# Patient Record
Sex: FEMALE | Race: WHITE | NOT HISPANIC OR LATINO | URBAN - METROPOLITAN AREA
[De-identification: names, ages, dates, MRNs, and addresses within clinical notes are randomized per-mention and may not be internally consistent; named-entity substitution may affect disease eponyms.]

---

## 2016-07-28 RX ORDER — ENOXAPARIN SODIUM 100 MG/ML
30 INJECTION SUBCUTANEOUS
Qty: 0 | Refills: 0 | COMMUNITY
Start: 2016-07-28 | End: 2016-08-27

## 2018-02-17 ENCOUNTER — INPATIENT (INPATIENT)
Facility: HOSPITAL | Age: 63
LOS: 4 days | Discharge: HOME | DRG: 436 | End: 2018-02-22
Attending: INTERNAL MEDICINE | Admitting: INTERNAL MEDICINE
Payer: MEDICARE

## 2018-02-17 VITALS
SYSTOLIC BLOOD PRESSURE: 125 MMHG | RESPIRATION RATE: 22 BRPM | TEMPERATURE: 98 F | HEART RATE: 130 BPM | DIASTOLIC BLOOD PRESSURE: 71 MMHG | OXYGEN SATURATION: 96 %

## 2018-02-17 DIAGNOSIS — I10 ESSENTIAL (PRIMARY) HYPERTENSION: ICD-10-CM

## 2018-02-17 DIAGNOSIS — Z98.89 OTHER SPECIFIED POSTPROCEDURAL STATES: Chronic | ICD-10-CM

## 2018-02-17 DIAGNOSIS — Z29.9 ENCOUNTER FOR PROPHYLACTIC MEASURES, UNSPECIFIED: ICD-10-CM

## 2018-02-17 DIAGNOSIS — R60.0 LOCALIZED EDEMA: ICD-10-CM

## 2018-02-17 DIAGNOSIS — C22.0 LIVER CELL CARCINOMA: ICD-10-CM

## 2018-02-17 DIAGNOSIS — J44.9 CHRONIC OBSTRUCTIVE PULMONARY DISEASE, UNSPECIFIED: ICD-10-CM

## 2018-02-17 DIAGNOSIS — E11.9 TYPE 2 DIABETES MELLITUS WITHOUT COMPLICATIONS: ICD-10-CM

## 2018-02-17 DIAGNOSIS — R74.8 ABNORMAL LEVELS OF OTHER SERUM ENZYMES: ICD-10-CM

## 2018-02-17 DIAGNOSIS — E87.70 FLUID OVERLOAD, UNSPECIFIED: ICD-10-CM

## 2018-02-17 LAB
ALBUMIN SERPL ELPH-MCNC: 2.1 G/DL — LOW (ref 3.5–5)
ALP SERPL-CCNC: 280 U/L — HIGH (ref 40–120)
ALT FLD-CCNC: 76 U/L DA — HIGH (ref 10–60)
ANION GAP SERPL CALC-SCNC: 14 MMOL/L — SIGNIFICANT CHANGE UP (ref 5–17)
AST SERPL-CCNC: 338 U/L — HIGH (ref 10–40)
BILIRUB DIRECT SERPL-MCNC: 3.1 MG/DL — HIGH (ref 0–0.2)
BILIRUB SERPL-MCNC: 4.3 MG/DL — HIGH (ref 0.2–1.2)
BUN SERPL-MCNC: 18 MG/DL — SIGNIFICANT CHANGE UP (ref 7–18)
CALCIUM SERPL-MCNC: 8.7 MG/DL — SIGNIFICANT CHANGE UP (ref 8.4–10.5)
CHLORIDE SERPL-SCNC: 99 MMOL/L — SIGNIFICANT CHANGE UP (ref 96–108)
CO2 SERPL-SCNC: 23 MMOL/L — SIGNIFICANT CHANGE UP (ref 22–31)
CREAT SERPL-MCNC: 0.7 MG/DL — SIGNIFICANT CHANGE UP (ref 0.5–1.3)
GLUCOSE BLDC GLUCOMTR-MCNC: 104 MG/DL — HIGH (ref 70–99)
GLUCOSE BLDC GLUCOMTR-MCNC: 96 MG/DL — SIGNIFICANT CHANGE UP (ref 70–99)
GLUCOSE SERPL-MCNC: 123 MG/DL — HIGH (ref 70–99)
HCT VFR BLD CALC: 39.4 % — SIGNIFICANT CHANGE UP (ref 34.5–45)
HGB BLD-MCNC: 12.1 G/DL — SIGNIFICANT CHANGE UP (ref 11.5–15.5)
MCHC RBC-ENTMCNC: 27.9 PG — SIGNIFICANT CHANGE UP (ref 27–34)
MCHC RBC-ENTMCNC: 30.8 GM/DL — LOW (ref 32–36)
MCV RBC AUTO: 90.5 FL — SIGNIFICANT CHANGE UP (ref 80–100)
NT-PROBNP SERPL-SCNC: 883 PG/ML — HIGH (ref 0–125)
PLATELET # BLD AUTO: 155 K/UL — SIGNIFICANT CHANGE UP (ref 150–400)
POTASSIUM SERPL-MCNC: 3.3 MMOL/L — LOW (ref 3.5–5.3)
POTASSIUM SERPL-SCNC: 3.3 MMOL/L — LOW (ref 3.5–5.3)
PROT SERPL-MCNC: 6.8 G/DL — SIGNIFICANT CHANGE UP (ref 6–8.3)
RBC # BLD: 4.35 M/UL — SIGNIFICANT CHANGE UP (ref 3.8–5.2)
RBC # FLD: 19.3 % — HIGH (ref 10.3–14.5)
SODIUM SERPL-SCNC: 136 MMOL/L — SIGNIFICANT CHANGE UP (ref 135–145)
TROPONIN I SERPL-MCNC: <0.015 NG/ML — SIGNIFICANT CHANGE UP (ref 0–0.04)
WBC # BLD: 15.3 K/UL — HIGH (ref 3.8–10.5)
WBC # FLD AUTO: 15.3 K/UL — HIGH (ref 3.8–10.5)

## 2018-02-17 PROCEDURE — 76705 ECHO EXAM OF ABDOMEN: CPT | Mod: 26

## 2018-02-17 PROCEDURE — 93010 ELECTROCARDIOGRAM REPORT: CPT

## 2018-02-17 PROCEDURE — 99285 EMERGENCY DEPT VISIT HI MDM: CPT

## 2018-02-17 PROCEDURE — 71046 X-RAY EXAM CHEST 2 VIEWS: CPT | Mod: 26

## 2018-02-17 RX ORDER — IPRATROPIUM/ALBUTEROL SULFATE 18-103MCG
3 AEROSOL WITH ADAPTER (GRAM) INHALATION EVERY 6 HOURS
Qty: 0 | Refills: 0 | Status: DISCONTINUED | OUTPATIENT
Start: 2018-02-17 | End: 2018-02-19

## 2018-02-17 RX ORDER — TRAMADOL HYDROCHLORIDE 50 MG/1
50 TABLET ORAL EVERY 6 HOURS
Qty: 0 | Refills: 0 | Status: DISCONTINUED | OUTPATIENT
Start: 2018-02-17 | End: 2018-02-21

## 2018-02-17 RX ORDER — TIOTROPIUM BROMIDE 18 UG/1
1 CAPSULE ORAL; RESPIRATORY (INHALATION) DAILY
Qty: 0 | Refills: 0 | Status: DISCONTINUED | OUTPATIENT
Start: 2018-02-17 | End: 2018-02-22

## 2018-02-17 RX ORDER — NICOTINE POLACRILEX 2 MG
1 GUM BUCCAL DAILY
Qty: 0 | Refills: 0 | Status: DISCONTINUED | OUTPATIENT
Start: 2018-02-17 | End: 2018-02-22

## 2018-02-17 RX ORDER — THIAMINE MONONITRATE (VIT B1) 100 MG
100 TABLET ORAL DAILY
Qty: 0 | Refills: 0 | Status: DISCONTINUED | OUTPATIENT
Start: 2018-02-17 | End: 2018-02-22

## 2018-02-17 RX ORDER — INSULIN GLARGINE 100 [IU]/ML
30 INJECTION, SOLUTION SUBCUTANEOUS AT BEDTIME
Qty: 0 | Refills: 0 | Status: DISCONTINUED | OUTPATIENT
Start: 2018-02-17 | End: 2018-02-22

## 2018-02-17 RX ORDER — METOPROLOL TARTRATE 50 MG
25 TABLET ORAL
Qty: 0 | Refills: 0 | Status: DISCONTINUED | OUTPATIENT
Start: 2018-02-17 | End: 2018-02-22

## 2018-02-17 RX ORDER — FUROSEMIDE 40 MG
80 TABLET ORAL ONCE
Qty: 0 | Refills: 0 | Status: COMPLETED | OUTPATIENT
Start: 2018-02-17 | End: 2018-02-17

## 2018-02-17 RX ORDER — INSULIN LISPRO 100/ML
VIAL (ML) SUBCUTANEOUS
Qty: 0 | Refills: 0 | Status: DISCONTINUED | OUTPATIENT
Start: 2018-02-17 | End: 2018-02-22

## 2018-02-17 RX ORDER — ENOXAPARIN SODIUM 100 MG/ML
40 INJECTION SUBCUTANEOUS DAILY
Qty: 0 | Refills: 0 | Status: DISCONTINUED | OUTPATIENT
Start: 2018-02-17 | End: 2018-02-22

## 2018-02-17 RX ORDER — CAPTOPRIL 12.5 MG/1
12.5 TABLET ORAL ONCE
Qty: 0 | Refills: 0 | Status: COMPLETED | OUTPATIENT
Start: 2018-02-17 | End: 2018-02-17

## 2018-02-17 RX ORDER — FUROSEMIDE 40 MG
40 TABLET ORAL
Qty: 0 | Refills: 0 | Status: DISCONTINUED | OUTPATIENT
Start: 2018-02-17 | End: 2018-02-22

## 2018-02-17 RX ORDER — POTASSIUM CHLORIDE 20 MEQ
40 PACKET (EA) ORAL ONCE
Qty: 0 | Refills: 0 | Status: COMPLETED | OUTPATIENT
Start: 2018-02-17 | End: 2018-02-17

## 2018-02-17 RX ORDER — INFLUENZA VIRUS VACCINE 15; 15; 15; 15 UG/.5ML; UG/.5ML; UG/.5ML; UG/.5ML
0.5 SUSPENSION INTRAMUSCULAR ONCE
Qty: 0 | Refills: 0 | Status: COMPLETED | OUTPATIENT
Start: 2018-02-17 | End: 2018-02-22

## 2018-02-17 RX ADMIN — Medication 40 MILLIGRAM(S): at 19:50

## 2018-02-17 RX ADMIN — Medication 80 MILLIGRAM(S): at 16:25

## 2018-02-17 RX ADMIN — INSULIN GLARGINE 30 UNIT(S): 100 INJECTION, SOLUTION SUBCUTANEOUS at 22:16

## 2018-02-17 RX ADMIN — ENOXAPARIN SODIUM 40 MILLIGRAM(S): 100 INJECTION SUBCUTANEOUS at 22:16

## 2018-02-17 RX ADMIN — Medication 40 MILLIEQUIVALENT(S): at 19:20

## 2018-02-17 RX ADMIN — CAPTOPRIL 12.5 MILLIGRAM(S): 12.5 TABLET ORAL at 17:17

## 2018-02-17 NOTE — H&P ADULT - PROBLEM SELECTOR PLAN 1
-Not likely to be cellulitis or Venous stasis of extremities because skin is intact and his normal color . Pro-bnp is almost normal to which makes CHF less likely of a diagnosis causing people edema.   -likely secondary to liver failure from hepatitis c and h/o  HCC   -Monitor liver enzymes closely   -elevated bilurubin { but no abdominal pain or fever }  follow with abd usg   c/w lasix 40 mg I.V BID   -MAY benefit from albumin i.v 50 mg drip  follow with duplex usg { to r/o  clots }  -tramadol for pain   constant repositioning

## 2018-02-17 NOTE — H&P ADULT - PROBLEM SELECTOR PLAN 6
Holding oral hypoglycemics  - Continue with HISS  - Monitor blood sugar before meals and at bedtime.   -c/w lantus 30 units at bedtime

## 2018-02-17 NOTE — ED ADULT TRIAGE NOTE - CHIEF COMPLAINT QUOTE
C/o " I was sent by my PMD Dr Flores " my legs are swollen, I need the fluid taken out I can't walk. I haven't had my diabetes checked in two years, my stomach needs to be checked its swollen and I have a growth on my neck that's getting bigger".

## 2018-02-17 NOTE — H&P ADULT - HISTORY OF PRESENT ILLNESS
62 yo F from home , with a hx of HCC involving 2 lobes of liver diagnosed in 6/2016 s/p IR embolization, COPD (not on home O2), IDDM, Hep C , HTN, Herpes Zoster presents to the E.D for worsening lower extremity edema . patient very poor historian and very upset staying IN ED does not want to give any history . patient denies any shortness of breath and able to lie down flat and comfortable appearing . patient complaining of severe pain in legs . patient denies any chest pain , shortness of breath , nausea , vomiting , abdominal pain but denies any blood in stool or any other urinary complaints.    IN the E.D patient vitals are temp-98 , hr-130 and rr-22 and spo2-96 and cxr -no evidence of pulmonary congestion

## 2018-02-17 NOTE — ED PROVIDER NOTE - PMH
Bilateral edema of lower extremity    COPD (chronic obstructive pulmonary disease)    Diabetes mellitus type 2, insulin dependent    Hepatitis C    Hepatocellular carcinoma    Herpes zoster without complication    Other specified diabetes mellitus with unspecified complications    Secondary hypertension, hypertension with unspecified goal    Varicose veins

## 2018-02-17 NOTE — H&P ADULT - NSHPLABSRESULTS_GEN_ALL_CORE
Vital Signs Last 24 Hrs  T(C): 36.7 (17 Feb 2018 15:01), Max: 36.7 (17 Feb 2018 15:01)  T(F): 98 (17 Feb 2018 15:01), Max: 98 (17 Feb 2018 15:01)  HR: 130 (17 Feb 2018 15:01) (130 - 130)  BP: 125/71 (17 Feb 2018 15:01) (125/71 - 125/71)  BP(mean): --  RR: 22 (17 Feb 2018 15:01) (22 - 22)  SpO2: 96% (17 Feb 2018 15:01) (96% - 96%)      LABS:                        12.1   15.3  )-----------( 155      ( 17 Feb 2018 16:31 )             39.4     02-17    136  |  99  |  18  ----------------------------<  123<H>  3.3<L>   |  23  |  0.70    Ca    8.7      17 Feb 2018 16:31    TPro  6.8  /  Alb  2.1<L>  /  TBili  4.3<H>  /  DBili  x   /  AST  338<H>  /  ALT  76<H>  /  AlkPhos  280<H>  02-17        CAPILLARY BLOOD GLUCOSE          RADIOLOGY & ADDITIONAL TESTS:    Imaging Personally Reviewed: cxr ; clear lungs

## 2018-02-17 NOTE — ED PROVIDER NOTE - MEDICAL DECISION MAKING DETAILS
pitting lower extremity edema, lung auscultation minimal crackles at bases, cxr no fluid overload. bnp 800s. abd distention, nontender, given h/o cirrhosis possible new ascites. will do sono, diurese.   admit for further workup and mgmt

## 2018-02-17 NOTE — H&P ADULT - FAMILY HISTORY
Father  Still living? No  Family history of coronary artery disease, Age at diagnosis: Age Unknown     Mother  Still living? No  Family history of diabetes mellitus, Age at diagnosis: Age Unknown

## 2018-02-17 NOTE — H&P ADULT - PROBLEM SELECTOR PLAN 3
S/p IR embolization today   -No apparent hematoma in groin site   HEPATIC, HEPATIC SEGMENT 2, 2 branches of the hepatic segment 2 arteriogram and Particle embolization of hepatic segment 2 arterial   branches supplying the tumor.

## 2018-02-17 NOTE — H&P ADULT - ASSESSMENT
62 yo F from home , with a hx of HCC involving 2 lobes of liver diagnosed in 6/2016 s/p IR embolization, COPD (not on home O2), IDDM, Hep C , HTN, Herpes Zoster presents to the E.D for worsening lower extremity edema .      will admit the patient for   1} anasarca   2}ELEVATED liver enzymes   3]copd   4}need for prophylactic measure

## 2018-02-17 NOTE — ED ADULT NURSE NOTE - OBJECTIVE STATEMENT
received Pt Pt from triage via wheelchair alert and verbally responsive complaining on B/L LE swelling  Pt noted with +4 pitting edema and distended abd also reports difficulty in breathing denies C/P

## 2018-02-17 NOTE — ED PROVIDER NOTE - CONDUCTED A DETAILED DISCUSSION WITH PATIENT AND/OR GUARDIAN REGARDING, MDM
need for outpatient follow-up/radiology results/lab results radiology results/need to admit/lab results

## 2018-02-17 NOTE — H&P ADULT - PROBLEM SELECTOR PLAN 2
likely secondary to liver failure from hepatitis c and h/o  HCC   -Monitor liver enzymes closely   -elevated bilurubin { but no abdominal pain or fever }  follow with abd usg likely secondary to liver failure from hepatitis c and h/o  HCC   -Monitor liver enzymes closely   AST:ALT RATIO 2:1  { CONCERN FOR ALCOHOL ABUSE }  -elevated bilurubin { but no abdominal pain or fever }  follow with abd usg  FOLLOW WITH BLOOD ALCOHOL

## 2018-02-17 NOTE — ED PROVIDER NOTE - OBJECTIVE STATEMENT
64 y/o F pt with PMHx of b/l Lower Extremity Edema, COPD, Type II DM (unchecked in x2 years; last took insulin this morning at 11:00am), Hepatitis C, Hepatocellular Carcinoma, Herpes Zoster, HTN, and Varicose Veins and PSHx of Eye Surgery, Liver Biopsy, and Lumpectomy presents to ED c/o chronic b/l leg swelling that worsened approximately x7-10 days ago. Pt additionally reports abdominal distension. Pt states she has been unable to walk secondary to pain associated with her varicose veins, making pt immobile and bed-bound for several weeks. Per pt, pt visited her PMD earlier today, and was instructed to visit the ED for further evaluation. Pt denies fever, chills, CP, or any other complaints. Pt also denies Hx of liver cirrhosis, or Hx of abdominal surgeries in the past. Pt is currently on Furosemide 40mg BID. Allergies: Penicillin (anaphylaxis). PMD: Dr. Flores.

## 2018-02-18 LAB
ALBUMIN SERPL ELPH-MCNC: 1.8 G/DL — LOW (ref 3.5–5)
ALBUMIN SERPL ELPH-MCNC: 1.9 G/DL — LOW (ref 3.5–5)
ALP SERPL-CCNC: 259 U/L — HIGH (ref 40–120)
ALP SERPL-CCNC: 264 U/L — HIGH (ref 40–120)
ALT FLD-CCNC: 72 U/L DA — HIGH (ref 10–60)
ALT FLD-CCNC: 73 U/L DA — HIGH (ref 10–60)
ANION GAP SERPL CALC-SCNC: 9 MMOL/L — SIGNIFICANT CHANGE UP (ref 5–17)
APPEARANCE UR: CLEAR — SIGNIFICANT CHANGE UP
AST SERPL-CCNC: 369 U/L — HIGH (ref 10–40)
AST SERPL-CCNC: 370 U/L — HIGH (ref 10–40)
BASOPHILS # BLD AUTO: 0.1 K/UL — SIGNIFICANT CHANGE UP (ref 0–0.2)
BASOPHILS NFR BLD AUTO: 1 % — SIGNIFICANT CHANGE UP (ref 0–2)
BILIRUB DIRECT SERPL-MCNC: 3.8 MG/DL — HIGH (ref 0–0.2)
BILIRUB INDIRECT FLD-MCNC: 0.9 MG/DL — SIGNIFICANT CHANGE UP (ref 0.2–1)
BILIRUB SERPL-MCNC: 4.7 MG/DL — HIGH (ref 0.2–1.2)
BILIRUB SERPL-MCNC: 4.8 MG/DL — HIGH (ref 0.2–1.2)
BILIRUB UR-MCNC: ABNORMAL
BUN SERPL-MCNC: 19 MG/DL — HIGH (ref 7–18)
CALCIUM SERPL-MCNC: 8.8 MG/DL — SIGNIFICANT CHANGE UP (ref 8.4–10.5)
CHLORIDE SERPL-SCNC: 100 MMOL/L — SIGNIFICANT CHANGE UP (ref 96–108)
CHOLEST SERPL-MCNC: 185 MG/DL — SIGNIFICANT CHANGE UP (ref 10–199)
CO2 SERPL-SCNC: 27 MMOL/L — SIGNIFICANT CHANGE UP (ref 22–31)
COLOR SPEC: ABNORMAL
CREAT SERPL-MCNC: 0.74 MG/DL — SIGNIFICANT CHANGE UP (ref 0.5–1.3)
DIFF PNL FLD: NEGATIVE — SIGNIFICANT CHANGE UP
EOSINOPHIL # BLD AUTO: 0 K/UL — SIGNIFICANT CHANGE UP (ref 0–0.5)
EOSINOPHIL NFR BLD AUTO: 0.3 % — SIGNIFICANT CHANGE UP (ref 0–6)
ETHANOL SERPL-MCNC: <3 MG/DL — SIGNIFICANT CHANGE UP (ref 0–10)
GLUCOSE BLDC GLUCOMTR-MCNC: 109 MG/DL — HIGH (ref 70–99)
GLUCOSE BLDC GLUCOMTR-MCNC: 140 MG/DL — HIGH (ref 70–99)
GLUCOSE BLDC GLUCOMTR-MCNC: 179 MG/DL — HIGH (ref 70–99)
GLUCOSE BLDC GLUCOMTR-MCNC: 84 MG/DL — SIGNIFICANT CHANGE UP (ref 70–99)
GLUCOSE SERPL-MCNC: 95 MG/DL — SIGNIFICANT CHANGE UP (ref 70–99)
GLUCOSE UR QL: NEGATIVE — SIGNIFICANT CHANGE UP
HBA1C BLD-MCNC: 5.8 % — HIGH (ref 4–5.6)
HCT VFR BLD CALC: 35.9 % — SIGNIFICANT CHANGE UP (ref 34.5–45)
HDLC SERPL-MCNC: 10 MG/DL — LOW (ref 40–125)
HGB BLD-MCNC: 11.1 G/DL — LOW (ref 11.5–15.5)
KETONES UR-MCNC: NEGATIVE — SIGNIFICANT CHANGE UP
LEUKOCYTE ESTERASE UR-ACNC: ABNORMAL
LIPID PNL WITH DIRECT LDL SERPL: 138 MG/DL — SIGNIFICANT CHANGE UP
LYMPHOCYTES # BLD AUTO: 15.7 % — SIGNIFICANT CHANGE UP (ref 13–44)
LYMPHOCYTES # BLD AUTO: 2 K/UL — SIGNIFICANT CHANGE UP (ref 1–3.3)
MAGNESIUM SERPL-MCNC: 2.1 MG/DL — SIGNIFICANT CHANGE UP (ref 1.6–2.6)
MCHC RBC-ENTMCNC: 28.9 PG — SIGNIFICANT CHANGE UP (ref 27–34)
MCHC RBC-ENTMCNC: 30.9 GM/DL — LOW (ref 32–36)
MCV RBC AUTO: 93.4 FL — SIGNIFICANT CHANGE UP (ref 80–100)
MONOCYTES # BLD AUTO: 1.5 K/UL — HIGH (ref 0–0.9)
MONOCYTES NFR BLD AUTO: 11.6 % — SIGNIFICANT CHANGE UP (ref 2–14)
NEUTROPHILS # BLD AUTO: 9.1 K/UL — HIGH (ref 1.8–7.4)
NEUTROPHILS NFR BLD AUTO: 71.5 % — SIGNIFICANT CHANGE UP (ref 43–77)
NITRITE UR-MCNC: NEGATIVE — SIGNIFICANT CHANGE UP
PCP SPEC-MCNC: SIGNIFICANT CHANGE UP
PH UR: 6 — SIGNIFICANT CHANGE UP (ref 5–8)
PHOSPHATE SERPL-MCNC: 1.8 MG/DL — LOW (ref 2.5–4.5)
PLATELET # BLD AUTO: 143 K/UL — LOW (ref 150–400)
POTASSIUM SERPL-MCNC: 3.2 MMOL/L — LOW (ref 3.5–5.3)
POTASSIUM SERPL-SCNC: 3.2 MMOL/L — LOW (ref 3.5–5.3)
PROCALCITONIN SERPL-MCNC: 0.4 NG/ML — HIGH (ref 0–0.04)
PROT SERPL-MCNC: 6.2 G/DL — SIGNIFICANT CHANGE UP (ref 6–8.3)
PROT SERPL-MCNC: 6.3 G/DL — SIGNIFICANT CHANGE UP (ref 6–8.3)
PROT UR-MCNC: NEGATIVE — SIGNIFICANT CHANGE UP
RBC # BLD: 3.85 M/UL — SIGNIFICANT CHANGE UP (ref 3.8–5.2)
RBC # FLD: 19.9 % — HIGH (ref 10.3–14.5)
SODIUM SERPL-SCNC: 136 MMOL/L — SIGNIFICANT CHANGE UP (ref 135–145)
SP GR SPEC: 1.01 — SIGNIFICANT CHANGE UP (ref 1.01–1.02)
TOTAL CHOLESTEROL/HDL RATIO MEASUREMENT: 18.5 RATIO — HIGH (ref 3.3–7.1)
TRIGL SERPL-MCNC: 185 MG/DL — HIGH (ref 10–149)
TSH SERPL-MCNC: 2.86 UU/ML — SIGNIFICANT CHANGE UP (ref 0.34–4.82)
UROBILINOGEN FLD QL: 8
VIT B12 SERPL-MCNC: 1487 PG/ML — HIGH (ref 232–1245)
WBC # BLD: 12.7 K/UL — HIGH (ref 3.8–10.5)
WBC # FLD AUTO: 12.7 K/UL — HIGH (ref 3.8–10.5)

## 2018-02-18 RX ADMIN — Medication 25 MILLIGRAM(S): at 17:35

## 2018-02-18 RX ADMIN — TIOTROPIUM BROMIDE 1 CAPSULE(S): 18 CAPSULE ORAL; RESPIRATORY (INHALATION) at 12:33

## 2018-02-18 RX ADMIN — TRAMADOL HYDROCHLORIDE 50 MILLIGRAM(S): 50 TABLET ORAL at 21:40

## 2018-02-18 RX ADMIN — TRAMADOL HYDROCHLORIDE 50 MILLIGRAM(S): 50 TABLET ORAL at 11:06

## 2018-02-18 RX ADMIN — TRAMADOL HYDROCHLORIDE 50 MILLIGRAM(S): 50 TABLET ORAL at 15:16

## 2018-02-18 RX ADMIN — Medication 40 MILLIGRAM(S): at 17:35

## 2018-02-18 RX ADMIN — TRAMADOL HYDROCHLORIDE 50 MILLIGRAM(S): 50 TABLET ORAL at 21:39

## 2018-02-18 RX ADMIN — Medication 1 TABLET(S): at 12:33

## 2018-02-18 RX ADMIN — ENOXAPARIN SODIUM 40 MILLIGRAM(S): 100 INJECTION SUBCUTANEOUS at 12:34

## 2018-02-18 RX ADMIN — INSULIN GLARGINE 30 UNIT(S): 100 INJECTION, SOLUTION SUBCUTANEOUS at 22:01

## 2018-02-18 RX ADMIN — TRAMADOL HYDROCHLORIDE 50 MILLIGRAM(S): 50 TABLET ORAL at 09:35

## 2018-02-18 RX ADMIN — Medication 100 MILLIGRAM(S): at 12:33

## 2018-02-18 RX ADMIN — TRAMADOL HYDROCHLORIDE 50 MILLIGRAM(S): 50 TABLET ORAL at 16:46

## 2018-02-18 NOTE — PROGRESS NOTE ADULT - SUBJECTIVE AND OBJECTIVE BOX
Patient is a 63y old  Female who presents with a chief complaint of worsening lower extremity edema (17 Feb 2018 19:27)    PATIENT IS SEEN AND EXAMINED IN MEDICAL FLOOR.      ALLERGIES:  penicillin (Anaphylaxis)        VITALS:    Vital Signs Last 24 Hrs  T(C): 36.3 (18 Feb 2018 12:11), Max: 37.1 (17 Feb 2018 20:04)  T(F): 97.3 (18 Feb 2018 12:11), Max: 98.7 (17 Feb 2018 20:04)  HR: 100 (18 Feb 2018 12:11) (81 - 100)  BP: 113/79 (18 Feb 2018 12:11) (94/54 - 130/68)  BP(mean): --  RR: 16 (18 Feb 2018 12:11) (14 - 20)  SpO2: 97% (18 Feb 2018 12:11) (96% - 100%)    LABS:  CBC Full  -  ( 18 Feb 2018 07:52 )  WBC Count : 12.7 K/uL  Hemoglobin : 11.1 g/dL  Hematocrit : 35.9 %  Platelet Count - Automated : 143 K/uL  Mean Cell Volume : 93.4 fl  Mean Cell Hemoglobin : 28.9 pg  Mean Cell Hemoglobin Concentration : 30.9 gm/dL  Auto Neutrophil # : 9.1 K/uL  Auto Lymphocyte # : 2.0 K/uL  Auto Monocyte # : 1.5 K/uL  Auto Eosinophil # : 0.0 K/uL  Auto Basophil # : 0.1 K/uL  Auto Neutrophil % : 71.5 %  Auto Lymphocyte % : 15.7 %  Auto Monocyte % : 11.6 %  Auto Eosinophil % : 0.3 %  Auto Basophil % : 1.0 %      02-18    136  |  100  |  19<H>  ----------------------------<  95  3.2<L>   |  27  |  0.74    Ca    8.8      18 Feb 2018 07:52  Phos  1.8     02-18  Mg     2.1     02-18    TPro  6.2  /  Alb  1.8<L>  /  TBili  4.8<H>  /  DBili  3.8<H>  /  AST  370<H>  /  ALT  73<H>  /  AlkPhos  264<H>  02-18    CAPILLARY BLOOD GLUCOSE      POCT Blood Glucose.: 179 mg/dL (18 Feb 2018 11:30)  POCT Blood Glucose.: 109 mg/dL (18 Feb 2018 07:25)  POCT Blood Glucose.: 96 mg/dL (17 Feb 2018 21:56)  POCT Blood Glucose.: 104 mg/dL (17 Feb 2018 19:54)    CARDIAC MARKERS ( 17 Feb 2018 16:31 )  <0.015 ng/mL / x     / x     / x     / x          LIVER FUNCTIONS - ( 18 Feb 2018 07:52 )  Alb: 1.8 g/dL / Pro: 6.2 g/dL / ALK PHOS: 264 U/L / ALT: 73 U/L DA / AST: 370 U/L / GGT: x                     MEDICATIONS:    MEDICATIONS  (STANDING):  ALBUTerol/ipratropium for Nebulization 3 milliLiter(s) Nebulizer every 6 hours  enoxaparin Injectable 40 milliGRAM(s) SubCutaneous daily  furosemide   Injectable 40 milliGRAM(s) IV Push two times a day  influenza   Vaccine 0.5 milliLiter(s) IntraMuscular once  insulin glargine Injectable (LANTUS) 30 Unit(s) SubCutaneous at bedtime  insulin lispro (HumaLOG) corrective regimen sliding scale   SubCutaneous Before meals and at bedtime  metoprolol     tartrate 25 milliGRAM(s) Oral two times a day  multivitamin 1 Tablet(s) Oral daily  nicotine -  14 mG/24Hr(s) Patch 1 patch Transdermal daily  thiamine 100 milliGRAM(s) Oral daily  tiotropium 18 MICROgram(s) Capsule 1 Capsule(s) Inhalation daily      MEDICATIONS  (PRN):  LORazepam   Injectable 2 milliGRAM(s) IV Push every 6 hours PRN agitation, anxiety, assaultive behavior. CIWA >7  traMADol 50 milliGRAM(s) Oral every 6 hours PRN Moderate Pain (4 - 6)      REVIEW OF SYSTEMS:                           ALL ROS DONE [ X   ]    CONSTITUTIONAL:  LETHARGIC [   ], FEVER [   ], UNRESPONSIVE [   ]  CVS:  CP  [   ], SOB, [   ], PALPITATIONS [   ], DIZZYNESS [   ]  RS: COUGH [   ], SPUTUM [   ]  GI: ABDOMINAL PAIN [   ], NAUSEA [   ], VOMITINGS [   ], DIARRHEA [   ], CONSTIPATION [   ]  :  DYSURIA [   ], NOCTURIA [   ], INCREASED FREQUENCY [   ], DRIBLING [   ],  SKELETAL: PAINFUL JOINTS [   ], SWOLLEN JOINTS [   ], NECK ACHE [   ], LOW BACK ACHE [   ],  SKIN : ULCERS [   ], RASH [   ], ITCHING [   ]  CNS: HEAD ACHE [   ], DOUBLE VISION [   ], BLURRED VISION [   ], AMS / CONFUSION [   ], SEIZURES [   ], WEAKNESS [   ],TINGLING / NUMBNESS [   ]    PHYSICAL EXAMINATION:  GENERAL APPEARANCE: NO DISTRESS  HEENT:  NO PALLOR, NO  JVD,  NO   NODES, NECK SUPPLE  CVS: S1 +, S2 +,   RS: AEEB,  OCCASIONAL  RALES +,   NO RONCHI  ABD: SOFT, NT, NO, BS +  EXT: NO PE  SKIN: WARM,   SKELETAL:  ROM ACCEPTABLE  CNS:  AAO X    ,   DEFICITS    RADIOLOGY :    < from: US Hepatic & Pancreatic (02.17.18 @ 20:28) >  EXAM:  US LIVER AND PANCREAS                            PROCEDURE DATE:  02/17/2018          INTERPRETATION:  Clinical indication: Abdominal pain, history of   cirrhosis.    Technique: Targeted right upper quadrant ultrasound of the abdomen was   performed.      Comparison: MR 6/12/2016. CT 6/9/2016.     Findings: This study was technically difficult due to overlying bowel gas.    LIVER: 17.0 cm in length. Heterogeneous, diffusely increased echotexture,   which may reflect fatty infiltration/cirrhosis. Indeterminate lesions,   for example, 12.5 x 14.0 x 13.8 cm in the right lobe and 3.2 x 2.6 x 2.4   cm in the left lobe.   BILIARY: No intrahepatic or extrahepatic biliary dilatation. Visualized   common bile duct measuring up to 0.5 cm.   GALLBLADDER: No gallstone. Nonspecific gallbladder wall thickening   measuring up to 0.5 cm. No pericholecystic fluid. Negative sonographic   Esparza sign.    PANCREAS: Not visualized due to bowel gas.  RIGHT KIDNEY: 10.0 cm in length. No hydronephrosis or obvious renal   stone.   ADDITIONAL: No ascites.     Impression:      Indeterminate hepatic lesions, especially in the right lobe as described,   suspicious for progression of neoplasm. Recommend correlation with   previous interval outside study. Follow-up contrast-enhanced cross   sectional imaging (preferably MRI) would be helpful for further   evaluation.     Nonspecific gallbladder wall thickening. No cholelithiasis or sonographic   evidence of acute cholecystitis.     The pancreas was notvisualized, limiting evaluation.    < end of copied text >    < from: Xray Chest 2 Views PA/Lat (02.17.18 @ 16:27) >  EXAM:  XR CHEST PA LAT 2V                            PROCEDURE DATE:  02/17/2018          INTERPRETATION:  Chest 2 views    HISTORY: Leg swelling for 10 days    Comparison: 6/6/2016    Frontal and lateral views of the chest were obtained with suboptimal   inspiration. With allowance for this, the heart is normal in size and the   lungs show normal pulmonary vascularity. Apical pleural thickening is   unchanged. There is no evidence of pneumothorax nor pleural effusion.     IMPRESSION: No evidence of pulmonary congestion    < end of copied text >      ASSESSMENT :     Hypervolemia  Bilateral edema of lower extremity  Herpes zoster without complication  Diabetes mellitus type 2, insulin dependent  Hepatocellular carcinoma  Hepatitis C  Varicose veins  Secondary hypertension, hypertension with unspecified goal  Other specified diabetes mellitus with unspecified complications  COPD (chronic obstructive pulmonary disease)  CHF (congestive heart failure)  History of liver biopsy  H/O eye surgery  H/O lumpectomy  No significant past surgical history      PLAN:  HPI:  62 yo F from home , with a hx of HCC involving 2 lobes of liver diagnosed in 6/2016 s/p IR embolization, COPD (not on home O2), IDDM, Hep C , HTN, Herpes Zoster presents to the E.D for worsening lower extremity edema . patient very poor historian and very upset staying IN ED does not want to give any history . patient denies any shortness of breath and able to lie down flat and comfortable appearing . patient complaining of severe pain in legs . patient denies any chest pain , shortness of breath , nausea , vomiting , abdominal pain but denies any blood in stool or any other urinary complaints.    IN the E.D patient vitals are temp-98 , hr-130 and rr-22 and spo2-96 and cxr -no evidence of pulmonary congestion (17 Feb 2018 19:27)    - Patient is a 63y old  Female who presents with a chief complaint of worsening lower extremity edema (17 Feb 2018 19:27)    PATIENT IS SEEN AND EXAMINED IN MEDICAL FLOOR.      ALLERGIES:  penicillin (Anaphylaxis)        VITALS:    Vital Signs Last 24 Hrs  T(C): 36.3 (18 Feb 2018 12:11), Max: 37.1 (17 Feb 2018 20:04)  T(F): 97.3 (18 Feb 2018 12:11), Max: 98.7 (17 Feb 2018 20:04)  HR: 100 (18 Feb 2018 12:11) (81 - 100)  BP: 113/79 (18 Feb 2018 12:11) (94/54 - 130/68)  BP(mean): --  RR: 16 (18 Feb 2018 12:11) (14 - 20)  SpO2: 97% (18 Feb 2018 12:11) (96% - 100%)    LABS:  CBC Full  -  ( 18 Feb 2018 07:52 )  WBC Count : 12.7 K/uL  Hemoglobin : 11.1 g/dL  Hematocrit : 35.9 %  Platelet Count - Automated : 143 K/uL  Mean Cell Volume : 93.4 fl  Mean Cell Hemoglobin : 28.9 pg  Mean Cell Hemoglobin Concentration : 30.9 gm/dL  Auto Neutrophil # : 9.1 K/uL  Auto Lymphocyte # : 2.0 K/uL  Auto Monocyte # : 1.5 K/uL  Auto Eosinophil # : 0.0 K/uL  Auto Basophil # : 0.1 K/uL  Auto Neutrophil % : 71.5 %  Auto Lymphocyte % : 15.7 %  Auto Monocyte % : 11.6 %  Auto Eosinophil % : 0.3 %  Auto Basophil % : 1.0 %      02-18    136  |  100  |  19<H>  ----------------------------<  95  3.2<L>   |  27  |  0.74    Ca    8.8      18 Feb 2018 07:52  Phos  1.8     02-18  Mg     2.1     02-18    TPro  6.2  /  Alb  1.8<L>  /  TBili  4.8<H>  /  DBili  3.8<H>  /  AST  370<H>  /  ALT  73<H>  /  AlkPhos  264<H>  02-18    CAPILLARY BLOOD GLUCOSE      POCT Blood Glucose.: 179 mg/dL (18 Feb 2018 11:30)  POCT Blood Glucose.: 109 mg/dL (18 Feb 2018 07:25)  POCT Blood Glucose.: 96 mg/dL (17 Feb 2018 21:56)  POCT Blood Glucose.: 104 mg/dL (17 Feb 2018 19:54)    CARDIAC MARKERS ( 17 Feb 2018 16:31 )  <0.015 ng/mL / x     / x     / x     / x          LIVER FUNCTIONS - ( 18 Feb 2018 07:52 )  Alb: 1.8 g/dL / Pro: 6.2 g/dL / ALK PHOS: 264 U/L / ALT: 73 U/L DA / AST: 370 U/L / GGT: x                     MEDICATIONS:    MEDICATIONS  (STANDING):  ALBUTerol/ipratropium for Nebulization 3 milliLiter(s) Nebulizer every 6 hours  enoxaparin Injectable 40 milliGRAM(s) SubCutaneous daily  furosemide   Injectable 40 milliGRAM(s) IV Push two times a day  influenza   Vaccine 0.5 milliLiter(s) IntraMuscular once  insulin glargine Injectable (LANTUS) 30 Unit(s) SubCutaneous at bedtime  insulin lispro (HumaLOG) corrective regimen sliding scale   SubCutaneous Before meals and at bedtime  metoprolol     tartrate 25 milliGRAM(s) Oral two times a day  multivitamin 1 Tablet(s) Oral daily  nicotine -  14 mG/24Hr(s) Patch 1 patch Transdermal daily  thiamine 100 milliGRAM(s) Oral daily  tiotropium 18 MICROgram(s) Capsule 1 Capsule(s) Inhalation daily      MEDICATIONS  (PRN):  LORazepam   Injectable 2 milliGRAM(s) IV Push every 6 hours PRN agitation, anxiety, assaultive behavior. CIWA >7  traMADol 50 milliGRAM(s) Oral every 6 hours PRN Moderate Pain (4 - 6)      REVIEW OF SYSTEMS:                           ALL ROS DONE [ X   ]    CONSTITUTIONAL:  LETHARGIC [   ], FEVER [   ], UNRESPONSIVE [   ]  CVS:  CP  [   ], SOB, [   ], PALPITATIONS [   ], DIZZYNESS [   ]  RS: COUGH [   ], SPUTUM [   ]  GI: ABDOMINAL PAIN [   ], NAUSEA [   ], VOMITINGS [   ], DIARRHEA [   ], CONSTIPATION [   ]  :  DYSURIA [   ], NOCTURIA [   ], INCREASED FREQUENCY [   ], DRIBLING [   ],  SKELETAL: PAINFUL JOINTS [   ], SWOLLEN JOINTS [   ], NECK ACHE [   ], LOW BACK ACHE [   ],  SKIN : ULCERS [   ], RASH [   ], ITCHING [   ]  CNS: HEAD ACHE [   ], DOUBLE VISION [   ], BLURRED VISION [   ], AMS / CONFUSION [   ], SEIZURES [   ], WEAKNESS [   ],TINGLING / NUMBNESS [   ]    PHYSICAL EXAMINATION:  GENERAL APPEARANCE: NO DISTRESS  HEENT:  NO PALLOR, NO  JVD,  NO   NODES, NECK SUPPLE  CVS: S1 +, S2 +,   RS: AEEB,  OCCASIONAL  RALES +,  B/L  RONCHI  ABD: SOFT, NO, BS +, ASCITES +  EXT:  PE  ++  SKIN: WARM,   SKELETAL:  ROM ACCEPTABLE  CNS:  AAO X 3   , NO  DEFICITS    RADIOLOGY :    < from: US Hepatic & Pancreatic (02.17.18 @ 20:28) >  EXAM:  US LIVER AND PANCREAS                            PROCEDURE DATE:  02/17/2018          INTERPRETATION:  Clinical indication: Abdominal pain, history of   cirrhosis.    Technique: Targeted right upper quadrant ultrasound of the abdomen was   performed.      Comparison: MR 6/12/2016. CT 6/9/2016.     Findings: This study was technically difficult due to overlying bowel gas.    LIVER: 17.0 cm in length. Heterogeneous, diffusely increased echotexture,   which may reflect fatty infiltration/cirrhosis. Indeterminate lesions,   for example, 12.5 x 14.0 x 13.8 cm in the right lobe and 3.2 x 2.6 x 2.4   cm in the left lobe.   BILIARY: No intrahepatic or extrahepatic biliary dilatation. Visualized   common bile duct measuring up to 0.5 cm.   GALLBLADDER: No gallstone. Nonspecific gallbladder wall thickening   measuring up to 0.5 cm. No pericholecystic fluid. Negative sonographic   Esparza sign.    PANCREAS: Not visualized due to bowel gas.  RIGHT KIDNEY: 10.0 cm in length. No hydronephrosis or obvious renal   stone.   ADDITIONAL: No ascites.     Impression:      Indeterminate hepatic lesions, especially in the right lobe as described,   suspicious for progression of neoplasm. Recommend correlation with   previous interval outside study. Follow-up contrast-enhanced cross   sectional imaging (preferably MRI) would be helpful for further   evaluation.     Nonspecific gallbladder wall thickening. No cholelithiasis or sonographic   evidence of acute cholecystitis.     The pancreas was notvisualized, limiting evaluation.    < end of copied text >    < from: Xray Chest 2 Views PA/Lat (02.17.18 @ 16:27) >  EXAM:  XR CHEST PA LAT 2V                            PROCEDURE DATE:  02/17/2018          INTERPRETATION:  Chest 2 views    HISTORY: Leg swelling for 10 days    Comparison: 6/6/2016    Frontal and lateral views of the chest were obtained with suboptimal   inspiration. With allowance for this, the heart is normal in size and the   lungs show normal pulmonary vascularity. Apical pleural thickening is   unchanged. There is no evidence of pneumothorax nor pleural effusion.     IMPRESSION: No evidence of pulmonary congestion    < end of copied text >      ASSESSMENT :     Hypervolemia  Bilateral edema of lower extremity  Herpes zoster without complication  Diabetes mellitus type 2, insulin dependent  Hepatocellular carcinoma  Hepatitis C  Varicose veins  Secondary hypertension, hypertension with unspecified goal  Other specified diabetes mellitus with unspecified complications  COPD (chronic obstructive pulmonary disease)  CHF (congestive heart failure)  History of liver biopsy  H/O eye surgery  H/O lumpectomy      PLAN:  HPI:  62 yo F from home , with a hx of HCC involving 2 lobes of liver diagnosed in 6/2016 s/p IR embolization, COPD (not on home O2), IDDM, Hep C , HTN, Herpes Zoster presents to the E.D for worsening lower extremity edema . patient very poor historian and very upset staying IN ED does not want to give any history . patient denies any shortness of breath and able to lie down flat and comfortable appearing . patient complaining of severe pain in legs . patient denies any chest pain , shortness of breath , nausea , vomiting , abdominal pain but denies any blood in stool or any other urinary complaints.    IN the E.D patient vitals are temp-98 , hr-130 and rr-22 and spo2-96 and cxr -no evidence of pulmonary congestion (17 Feb 2018 19:27)    - SUSPECT RECURRENCE OF HEPATIC MALIGNANCY WITH B/L LOWER EXTREMITY LYMPHEDEMA  - OBTAIN ALPHAFETOPROTEIN, CEA, CA 19-9 MARKERS.   - OBTAIN ONCOLOGY EVALUATION BY DR. VENEGAS . DR. VENEGAS TO ADVISE IF HE NEEDS TISSUE DIAGNOSIS BY IR GUIDED DIAGNOSTIC PARACENTESIS   - GI AND DVT PROPHYLAXIS  - FURTHER OUT PATIENT EVALUATION AND MANAGEMENT OF MALIGNANCY ONCE IT IS CONFIRMED  - DR. CLEANING

## 2018-02-19 DIAGNOSIS — R60.9 EDEMA, UNSPECIFIED: ICD-10-CM

## 2018-02-19 DIAGNOSIS — C22.0 LIVER CELL CARCINOMA: ICD-10-CM

## 2018-02-19 DIAGNOSIS — R18.8 OTHER ASCITES: ICD-10-CM

## 2018-02-19 LAB
GLUCOSE BLDC GLUCOMTR-MCNC: 103 MG/DL — HIGH (ref 70–99)
GLUCOSE BLDC GLUCOMTR-MCNC: 106 MG/DL — HIGH (ref 70–99)
GLUCOSE BLDC GLUCOMTR-MCNC: 128 MG/DL — HIGH (ref 70–99)
GLUCOSE BLDC GLUCOMTR-MCNC: 187 MG/DL — HIGH (ref 70–99)
GLUCOSE BLDC GLUCOMTR-MCNC: 96 MG/DL — SIGNIFICANT CHANGE UP (ref 70–99)

## 2018-02-19 RX ORDER — POTASSIUM CHLORIDE 20 MEQ
40 PACKET (EA) ORAL ONCE
Qty: 0 | Refills: 0 | Status: COMPLETED | OUTPATIENT
Start: 2018-02-19 | End: 2018-02-19

## 2018-02-19 RX ORDER — INSULIN GLARGINE 100 [IU]/ML
15 INJECTION, SOLUTION SUBCUTANEOUS ONCE
Qty: 0 | Refills: 0 | Status: COMPLETED | OUTPATIENT
Start: 2018-02-19 | End: 2018-02-19

## 2018-02-19 RX ADMIN — Medication 40 MILLIGRAM(S): at 17:40

## 2018-02-19 RX ADMIN — TRAMADOL HYDROCHLORIDE 50 MILLIGRAM(S): 50 TABLET ORAL at 15:00

## 2018-02-19 RX ADMIN — Medication 1: at 11:52

## 2018-02-19 RX ADMIN — TRAMADOL HYDROCHLORIDE 50 MILLIGRAM(S): 50 TABLET ORAL at 11:50

## 2018-02-19 RX ADMIN — ENOXAPARIN SODIUM 40 MILLIGRAM(S): 100 INJECTION SUBCUTANEOUS at 11:53

## 2018-02-19 RX ADMIN — TRAMADOL HYDROCHLORIDE 50 MILLIGRAM(S): 50 TABLET ORAL at 21:10

## 2018-02-19 RX ADMIN — TRAMADOL HYDROCHLORIDE 50 MILLIGRAM(S): 50 TABLET ORAL at 05:54

## 2018-02-19 RX ADMIN — Medication 25 MILLIGRAM(S): at 05:55

## 2018-02-19 RX ADMIN — TRAMADOL HYDROCHLORIDE 50 MILLIGRAM(S): 50 TABLET ORAL at 20:22

## 2018-02-19 RX ADMIN — TIOTROPIUM BROMIDE 1 CAPSULE(S): 18 CAPSULE ORAL; RESPIRATORY (INHALATION) at 11:53

## 2018-02-19 RX ADMIN — Medication 40 MILLIGRAM(S): at 05:54

## 2018-02-19 RX ADMIN — Medication 40 MILLIEQUIVALENT(S): at 17:43

## 2018-02-19 NOTE — CONSULT NOTE ADULT - PROBLEM SELECTOR RECOMMENDATION 3
at legs due to liver cirrhosis   will do paracentesis, after that leg edema will be easier to deal with

## 2018-02-19 NOTE — CONSULT NOTE ADULT - PROBLEM SELECTOR RECOMMENDATION 9
had 2 hepatoma 2.6 and 1.5 cm s/p embolization  now Sono showed 2 large masses  will do CT with contrast

## 2018-02-19 NOTE — CHART NOTE - NSCHARTNOTEFT_GEN_A_CORE
Patient is a 63y old  Female who presents with a chief complaint of worsening lower extremity edema (17 Feb 2018 19:27)  D/w dr. Galeas, patient needs hem/onc eval for possible diagnostic paracentesis r/o recurrence hepatic CA. Dr. Payne consult requested, pending.  c/w IV diuresis, electrolyte replacement PRN.

## 2018-02-19 NOTE — CONSULT NOTE ADULT - SUBJECTIVE AND OBJECTIVE BOX
61 year old lady with HepC treated with interferon 8 years ago and had undetectable viral load 2016.  @ hepatoma were found in 2016 and she had 2 embolization done.  She has been on diuretics to control edema and ascites.  the edema and ascites began to increase 10 days ago.  she has pain at legs.  no sob and she was able to eat normally.  no fever orchisll.  alert, but not cooperative. no palpable nodes at neck. chest is clear. abd is tense distended.  2+ edema at both legs.  Complete Blood Count + Automated Diff (02.18.18 @ 07:52)    WBC Count: 12.7 K/uL    RBC Count: 3.85 M/uL    Hemoglobin: 11.1 g/dL    Hematocrit: 35.9 %    Mean Cell Volume: 93.4 fl    Mean Cell Hemoglobin: 28.9 pg    Mean Cell Hemoglobin Conc: 30.9 gm/dL    Red Cell Distrib Width: 19.9 %    Platelet Count - Automated: 143 K/uL    Auto Neutrophil #: 9.1 K/uL    Auto Lymphocyte #: 2.0 K/uL    Auto Monocyte #: 1.5 K/uL    Auto Eosinophil #: 0.0 K/uL    Auto Basophil #: 0.1 K/uL    Auto Neutrophil %: 71.5: Differential percentages must be correlated with absolute numbers for  clinical significance. %    Auto Lymphocyte %: 15.7 %    Auto Monocyte %: 11.6 %    Auto Eosinophil %: 0.3 %    Auto Basophil %: 1.0 %    Comprehensive Metabolic Panel in AM (02.18.18 @ 07:52)    Sodium, Serum: 136 mmol/L    Potassium, Serum: 3.2 mmol/L    Chloride, Serum: 100 mmol/L    Carbon Dioxide, Serum: 27 mmol/L    Anion Gap, Serum: 9 mmol/L    Blood Urea Nitrogen, Serum: 19 mg/dL    Creatinine, Serum: 0.74 mg/dL    Glucose, Serum: 95 mg/dL    Calcium, Total Serum: 8.8 mg/dL    Protein Total, Serum: 6.2 g/dL    Albumin, Serum: 1.8 g/dL    Bilirubin Total, Serum: 4.8 mg/dL    Alkaline Phosphatase, Serum: 264 U/L    Aspartate Aminotransferase (AST/SGOT): 370 U/L    Alanine Aminotransferase (ALT/SGPT): 73 U/L DA    eGFR if Non : 85: Interpretative comment  The units for eGFR are ml/min/1.73m2 (normalized body surface area). The  eGFR is calculated from a serum creatinine using the CKD-EPI equation.  Other variables required for calculation are race, age and sex. Among  patients with chronic kidney disease (CKD), the eGFR is useful in  determining the stage of disease according to KDOQI CKD classification.  All eGFR results are reported numerically with the following  interpretation.          GFR                    With                 Without     (ml/min/1.73 m2)    Kidney Damage       Kidney Damage        >= 90                    Stage 1                     Normal        60-89                    Stage 2                     Decreased GFR        30-59     Stage 3                     Stage 3        15-29                    Stage 4                     Stage 4        < 15                      Stage 5                     Stage 5  Each stage of CKD assumes that the associated GFR level has been in  effect for at least 3 months. Determination of stages one and two (with  eGFR > 59 ml/min/m2) requires estimation of kidney damage for at least 3  months as defined by structural or functional abnormalities.  Limitations: All estimates of GFR will be less accurate for patients at  extremes of muscle mass (including but not limited to frail elderly,  critically ill, or cancer patients), those with unusual diets, and those  with conditions associated with reduced secretion or extrarenal  elimination of creatinine. The eGFR equation is not recommended for use  in patients with unstable creatinine levels. mL/min/1.73M2    eGFR if African American: 99 mL/min/1.73M2  < from: US Hepatic & Pancreatic (02.17.18 @ 20:28) >  INTERPRETATION:  Clinical indication: Abdominal pain, history of   cirrhosis.    Technique: Targeted right upper quadrant ultrasound of the abdomen was   performed.      Comparison: MR 6/12/2016. CT 6/9/2016.     Findings: This study was technically difficult due to overlying bowel gas.    LIVER: 17.0 cm in length. Heterogeneous, diffusely increased echotexture,   which may reflect fatty infiltration/cirrhosis. Indeterminate lesions,   for example, 12.5 x 14.0 x 13.8 cm in the right lobe and 3.2 x 2.6 x 2.4   cm in the left lobe.   BILIARY: No intrahepatic or extrahepatic biliary dilatation. Visualized   common bile duct measuring up to 0.5 cm.   GALLBLADDER: No gallstone. Nonspecific gallbladder wall thickening   measuring up to 0.5 cm. No pericholecystic fluid. Negative sonographic   Esparza sign.    PANCREAS: Not visualized due to bowel gas.  RIGHT KIDNEY: 10.0 cm in length. No hydronephrosis or obvious renal   stone.   ADDITIONAL: No ascites.     Impression:      Indeterminate hepatic lesions, especially in the right lobe as described,   suspicious for progression of neoplasm. Recommend correlation with   previous interval outside study. Follow-up contrast-enhanced cross   sectional imaging (preferably MRI) would be helpful for further   evaluation.     Nonspecific gallbladder wall thickening. No cholelithiasis or sonographic   evidence of acute cholecystitis.       < end of copied text >

## 2018-02-20 LAB
AFP-TM SERPL-MCNC: HIGH NG/ML
ALBUMIN SERPL ELPH-MCNC: 1.9 G/DL — LOW (ref 3.5–5)
ALP SERPL-CCNC: 309 U/L — HIGH (ref 40–120)
ALT FLD-CCNC: 80 U/L DA — HIGH (ref 10–60)
ANION GAP SERPL CALC-SCNC: 10 MMOL/L — SIGNIFICANT CHANGE UP (ref 5–17)
APTT BLD: 29.1 SEC — SIGNIFICANT CHANGE UP (ref 27.5–37.4)
AST SERPL-CCNC: 469 U/L — HIGH (ref 10–40)
BILIRUB SERPL-MCNC: 5.3 MG/DL — HIGH (ref 0.2–1.2)
BUN SERPL-MCNC: 16 MG/DL — SIGNIFICANT CHANGE UP (ref 7–18)
CALCIUM SERPL-MCNC: 8.5 MG/DL — SIGNIFICANT CHANGE UP (ref 8.4–10.5)
CANCER AG19-9 SERPL-ACNC: 1678.8 U/ML — HIGH
CEA SERPL-MCNC: 6.2 NG/ML — HIGH (ref 0–3.8)
CHLORIDE SERPL-SCNC: 98 MMOL/L — SIGNIFICANT CHANGE UP (ref 96–108)
CO2 SERPL-SCNC: 28 MMOL/L — SIGNIFICANT CHANGE UP (ref 22–31)
CREAT SERPL-MCNC: 0.78 MG/DL — SIGNIFICANT CHANGE UP (ref 0.5–1.3)
GLUCOSE BLDC GLUCOMTR-MCNC: 127 MG/DL — HIGH (ref 70–99)
GLUCOSE BLDC GLUCOMTR-MCNC: 160 MG/DL — HIGH (ref 70–99)
GLUCOSE BLDC GLUCOMTR-MCNC: 90 MG/DL — SIGNIFICANT CHANGE UP (ref 70–99)
GLUCOSE SERPL-MCNC: 114 MG/DL — HIGH (ref 70–99)
HCT VFR BLD CALC: 36.6 % — SIGNIFICANT CHANGE UP (ref 34.5–45)
HGB BLD-MCNC: 11.6 G/DL — SIGNIFICANT CHANGE UP (ref 11.5–15.5)
INR BLD: 1.57 RATIO — HIGH (ref 0.88–1.16)
MAGNESIUM SERPL-MCNC: 2.2 MG/DL — SIGNIFICANT CHANGE UP (ref 1.6–2.6)
MCHC RBC-ENTMCNC: 29.5 PG — SIGNIFICANT CHANGE UP (ref 27–34)
MCHC RBC-ENTMCNC: 31.8 GM/DL — LOW (ref 32–36)
MCV RBC AUTO: 92.8 FL — SIGNIFICANT CHANGE UP (ref 80–100)
PLATELET # BLD AUTO: 148 K/UL — LOW (ref 150–400)
POTASSIUM SERPL-MCNC: 3.7 MMOL/L — SIGNIFICANT CHANGE UP (ref 3.5–5.3)
POTASSIUM SERPL-SCNC: 3.7 MMOL/L — SIGNIFICANT CHANGE UP (ref 3.5–5.3)
PROT SERPL-MCNC: 6.5 G/DL — SIGNIFICANT CHANGE UP (ref 6–8.3)
PROTHROM AB SERPL-ACNC: 17.3 SEC — HIGH (ref 9.8–12.7)
RBC # BLD: 3.94 M/UL — SIGNIFICANT CHANGE UP (ref 3.8–5.2)
RBC # FLD: 20 % — HIGH (ref 10.3–14.5)
SODIUM SERPL-SCNC: 136 MMOL/L — SIGNIFICANT CHANGE UP (ref 135–145)
WBC # BLD: 13.9 K/UL — HIGH (ref 3.8–10.5)
WBC # FLD AUTO: 13.9 K/UL — HIGH (ref 3.8–10.5)

## 2018-02-20 PROCEDURE — 74182 MRI ABDOMEN W/CONTRAST: CPT | Mod: 26

## 2018-02-20 RX ADMIN — TRAMADOL HYDROCHLORIDE 50 MILLIGRAM(S): 50 TABLET ORAL at 14:32

## 2018-02-20 RX ADMIN — TRAMADOL HYDROCHLORIDE 50 MILLIGRAM(S): 50 TABLET ORAL at 03:35

## 2018-02-20 RX ADMIN — INSULIN GLARGINE 15 UNIT(S): 100 INJECTION, SOLUTION SUBCUTANEOUS at 00:01

## 2018-02-20 RX ADMIN — TRAMADOL HYDROCHLORIDE 50 MILLIGRAM(S): 50 TABLET ORAL at 08:28

## 2018-02-20 RX ADMIN — Medication 1: at 22:02

## 2018-02-20 RX ADMIN — TRAMADOL HYDROCHLORIDE 50 MILLIGRAM(S): 50 TABLET ORAL at 02:44

## 2018-02-20 RX ADMIN — Medication 40 MILLIGRAM(S): at 18:46

## 2018-02-20 RX ADMIN — Medication 25 MILLIGRAM(S): at 06:54

## 2018-02-20 RX ADMIN — INSULIN GLARGINE 30 UNIT(S): 100 INJECTION, SOLUTION SUBCUTANEOUS at 21:59

## 2018-02-20 RX ADMIN — Medication 25 MILLIGRAM(S): at 18:45

## 2018-02-20 RX ADMIN — Medication 40 MILLIGRAM(S): at 06:54

## 2018-02-20 RX ADMIN — TIOTROPIUM BROMIDE 1 CAPSULE(S): 18 CAPSULE ORAL; RESPIRATORY (INHALATION) at 18:46

## 2018-02-20 RX ADMIN — TRAMADOL HYDROCHLORIDE 50 MILLIGRAM(S): 50 TABLET ORAL at 20:31

## 2018-02-20 NOTE — PROGRESS NOTE ADULT - SUBJECTIVE AND OBJECTIVE BOX
Patient is a 63y old  Female who presents with a chief complaint of worsening lower extremity edema (17 Feb 2018 19:27)    PATIENT IS SEEN AND EXAMINED IN MEDICAL FLOOR.    ALLERGIES:  penicillin (Anaphylaxis)    VITALS:    Vital Signs Last 24 Hrs  T(C): 36.8 (20 Feb 2018 12:52), Max: 36.8 (20 Feb 2018 12:52)  T(F): 98.2 (20 Feb 2018 12:52), Max: 98.2 (20 Feb 2018 12:52)  HR: 113 (20 Feb 2018 12:52) (105 - 119)  BP: 105/68 (20 Feb 2018 12:52) (105/68 - 116/78)  BP(mean): --  RR: 17 (20 Feb 2018 12:52) (16 - 17)  SpO2: 94% (20 Feb 2018 12:52) (94% - 94%)    LABS:  CBC Full  -  ( 20 Feb 2018 07:19 )  WBC Count : 13.9 K/uL  Hemoglobin : 11.6 g/dL  Hematocrit : 36.6 %  Platelet Count - Automated : 148 K/uL  Mean Cell Volume : 92.8 fl  Mean Cell Hemoglobin : 29.5 pg  Mean Cell Hemoglobin Concentration : 31.8 gm/dL  Auto Neutrophil # : x  Auto Lymphocyte # : x  Auto Monocyte # : x  Auto Eosinophil # : x  Auto Basophil # : x  Auto Neutrophil % : x  Auto Lymphocyte % : x  Auto Monocyte % : x  Auto Eosinophil % : x  Auto Basophil % : x    PT/INR - ( 20 Feb 2018 07:19 )   PT: 17.3 sec;   INR: 1.57 ratio         PTT - ( 20 Feb 2018 07:19 )  PTT:29.1 sec  02-20    136  |  98  |  16  ----------------------------<  114<H>  3.7   |  28  |  0.78    Ca    8.5      20 Feb 2018 07:19  Mg     2.2     02-20    TPro  6.5  /  Alb  1.9<L>  /  TBili  5.3<H>  /  DBili  x   /  AST  469<H>  /  ALT  80<H>  /  AlkPhos  309<H>  02-20    CAPILLARY BLOOD GLUCOSE      POCT Blood Glucose.: 90 mg/dL (20 Feb 2018 11:40)  POCT Blood Glucose.: 127 mg/dL (20 Feb 2018 08:03)  POCT Blood Glucose.: 103 mg/dL (19 Feb 2018 23:52)  POCT Blood Glucose.: 106 mg/dL (19 Feb 2018 22:14)        LIVER FUNCTIONS - ( 20 Feb 2018 07:19 )  Alb: 1.9 g/dL / Pro: 6.5 g/dL / ALK PHOS: 309 U/L / ALT: 80 U/L DA / AST: 469 U/L / GGT: x                     MEDICATIONS:    MEDICATIONS  (STANDING):  enoxaparin Injectable 40 milliGRAM(s) SubCutaneous daily  furosemide   Injectable 40 milliGRAM(s) IV Push two times a day  influenza   Vaccine 0.5 milliLiter(s) IntraMuscular once  insulin glargine Injectable (LANTUS) 30 Unit(s) SubCutaneous at bedtime  insulin lispro (HumaLOG) corrective regimen sliding scale   SubCutaneous Before meals and at bedtime  metoprolol     tartrate 25 milliGRAM(s) Oral two times a day  multivitamin 1 Tablet(s) Oral daily  nicotine -  14 mG/24Hr(s) Patch 1 patch Transdermal daily  thiamine 100 milliGRAM(s) Oral daily  tiotropium 18 MICROgram(s) Capsule 1 Capsule(s) Inhalation daily      MEDICATIONS  (PRN):  LORazepam   Injectable 2 milliGRAM(s) IV Push every 6 hours PRN agitation, anxiety, assaultive behavior. CIWA >7  traMADol 50 milliGRAM(s) Oral every 6 hours PRN Moderate Pain (4 - 6)      REVIEW OF SYSTEMS:                           ALL ROS DONE [ X   ]    CONSTITUTIONAL:  LETHARGIC [   ], FEVER [   ], UNRESPONSIVE [   ]  CVS:  CP  [   ], SOB, [   ], PALPITATIONS [   ], DIZZYNESS [   ]  RS: COUGH [   ], SPUTUM [   ]  GI: ABDOMINAL PAIN [   ], NAUSEA [   ], VOMITINGS [   ], DIARRHEA [   ], CONSTIPATION [   ]  :  DYSURIA [   ], NOCTURIA [   ], INCREASED FREQUENCY [   ], DRIBLING [   ],  SKELETAL: PAINFUL JOINTS [   ], SWOLLEN JOINTS [   ], NECK ACHE [   ], LOW BACK ACHE [   ],  SKIN : ULCERS [   ], RASH [   ], ITCHING [   ]  CNS: HEAD ACHE [   ], DOUBLE VISION [   ], BLURRED VISION [   ], AMS / CONFUSION [   ], SEIZURES [   ], WEAKNESS [   ],TINGLING / NUMBNESS [   ]    PHYSICAL EXAMINATION:  GENERAL APPEARANCE: NO DISTRESS  HEENT:  NO PALLOR, NO  JVD,  NO   NODES, NECK SUPPLE  CVS: S1 +, S2 +,   RS: AEEB,  OCCASIONAL  RALES +,  B/L  RONCHI  ABD: SOFT, NO, BS +, ASCITES +  EXT:  PE  ++  SKIN: WARM,   SKELETAL:  ROM ACCEPTABLE  CNS:  AAO X 3   , NO  DEFICITS    RADIOLOGY :    < from: US Hepatic & Pancreatic (02.17.18 @ 20:28) >  EXAM:  US LIVER AND PANCREAS                            PROCEDURE DATE:  02/17/2018          INTERPRETATION:  Clinical indication: Abdominal pain, history of   cirrhosis.    Technique: Targeted right upper quadrant ultrasound of the abdomen was   performed.      Comparison: MR 6/12/2016. CT 6/9/2016.     Findings: This study was technically difficult due to overlying bowel gas.    LIVER: 17.0 cm in length. Heterogeneous, diffusely increased echotexture,   which may reflect fatty infiltration/cirrhosis. Indeterminate lesions,   for example, 12.5 x 14.0 x 13.8 cm in the right lobe and 3.2 x 2.6 x 2.4   cm in the left lobe.   BILIARY: No intrahepatic or extrahepatic biliary dilatation. Visualized   common bile duct measuring up to 0.5 cm.   GALLBLADDER: No gallstone. Nonspecific gallbladder wall thickening   measuring up to 0.5 cm. No pericholecystic fluid. Negative sonographic   Esparza sign.    PANCREAS: Not visualized due to bowel gas.  RIGHT KIDNEY: 10.0 cm in length. No hydronephrosis or obvious renal   stone.   ADDITIONAL: No ascites.     Impression:      Indeterminate hepatic lesions, especially in the right lobe as described,   suspicious for progression of neoplasm. Recommend correlation with   previous interval outside study. Follow-up contrast-enhanced cross   sectional imaging (preferably MRI) would be helpful for further   evaluation.     Nonspecific gallbladder wall thickening. No cholelithiasis or sonographic   evidence of acute cholecystitis.     The pancreas was notvisualized, limiting evaluation.    < end of copied text >    < from: Xray Chest 2 Views PA/Lat (02.17.18 @ 16:27) >  EXAM:  XR CHEST PA LAT 2V                            PROCEDURE DATE:  02/17/2018          INTERPRETATION:  Chest 2 views    HISTORY: Leg swelling for 10 days    Comparison: 6/6/2016    Frontal and lateral views of the chest were obtained with suboptimal   inspiration. With allowance for this, the heart is normal in size and the   lungs show normal pulmonary vascularity. Apical pleural thickening is   unchanged. There is no evidence of pneumothorax nor pleural effusion.     IMPRESSION: No evidence of pulmonary congestion    < end of copied text >      ASSESSMENT :     Hypervolemia  Bilateral edema of lower extremity  Herpes zoster without complication  Diabetes mellitus type 2, insulin dependent  Hepatocellular carcinoma  Hepatitis C  Varicose veins  Secondary hypertension, hypertension with unspecified goal  Other specified diabetes mellitus with unspecified complications  COPD (chronic obstructive pulmonary disease)  CHF (congestive heart failure)  History of liver biopsy  H/O eye surgery  H/O lumpectomy      PLAN:  HPI:  60 yo F from home , with a hx of HCC involving 2 lobes of liver diagnosed in 6/2016 s/p IR embolization, COPD (not on home O2), IDDM, Hep C , HTN, Herpes Zoster presents to the E.D for worsening lower extremity edema . patient very poor historian and very upset staying IN ED does not want to give any history . patient denies any shortness of breath and able to lie down flat and comfortable appearing . patient complaining of severe pain in legs . patient denies any chest pain , shortness of breath , nausea , vomiting , abdominal pain but denies any blood in stool or any other urinary complaints.    IN the E.D patient vitals are temp-98 , hr-130 and rr-22 and spo2-96 and cxr -no evidence of pulmonary congestion (17 Feb 2018 19:27)    - DC PLAN HOME TO FOLLOW UP ONCOLOGY AS OUT PATIENT  - RECURRENCE OF HEPATIC MALIGNANCY WITH B/L LOWER EXTREMITY LYMPHEDEMA  - ALPHAFETOPROTEIN  - 150378, CA 19-9 - 1670.   - ONCOLOGY EVALUATION BY DR. VENEGAS . IR COULD NOT DO PARACENTESIS  - GI AND DVT PROPHYLAXIS  - FURTHER OUT PATIENT EVALUATION AND MANAGEMENT OF MALIGNANCY ONCE IT IS CONFIRMED  - DR. CLEANING

## 2018-02-20 NOTE — CHART NOTE - NSCHARTNOTEFT_GEN_A_CORE
IR procedure cancelled b/c not enough fluid to tap.      CT abdomen and pelvis w/ IV contrast was reordered as MRI abd & pelv w/ IV contrast as recommended by Radiology Dept.  Discussed w/ Dr. Payne.  MRI result pending-f/u

## 2018-02-20 NOTE — PROGRESS NOTE ADULT - SUBJECTIVE AND OBJECTIVE BOX
condition same  feel pain at both lower legs, it hurts just at touch  not at feet  no ascites  for MRI of abd  AFP 137075  ca19.9 1600  it is recurrent hepatoma  ?radioembolization or nexavarAlpha Fetoprotein - Tumor Marker (02.20.18 @ 09:13)    Alpha Fetoprotein - Tumor Marker: [id number]: Method: Roche Electrochemilumenescence  Values obtained with different assay methods or kits cannot be  used interchangeably.  Results cannot be interpreted as absolute evidence of the presence  or absence of malignant disease.  AFP values are not interpretable in pregnant females. ng/mL condition same  feel pain at both lower legs, it hurts just at touch  not at feet  no ascites  for MRI of abd  AFP [id number]  ca19.9 1600  it is recurrent hepatoma  ?radioembolization or nexava

## 2018-02-21 LAB
ALBUMIN SERPL ELPH-MCNC: 2 G/DL — LOW (ref 3.5–5)
ALP SERPL-CCNC: 290 U/L — HIGH (ref 40–120)
ALT FLD-CCNC: 85 U/L DA — HIGH (ref 10–60)
ANION GAP SERPL CALC-SCNC: 9 MMOL/L — SIGNIFICANT CHANGE UP (ref 5–17)
AST SERPL-CCNC: 560 U/L — HIGH (ref 10–40)
BILIRUB SERPL-MCNC: 5.6 MG/DL — HIGH (ref 0.2–1.2)
BUN SERPL-MCNC: 17 MG/DL — SIGNIFICANT CHANGE UP (ref 7–18)
CALCIUM SERPL-MCNC: 8.7 MG/DL — SIGNIFICANT CHANGE UP (ref 8.4–10.5)
CHLORIDE SERPL-SCNC: 96 MMOL/L — SIGNIFICANT CHANGE UP (ref 96–108)
CO2 SERPL-SCNC: 27 MMOL/L — SIGNIFICANT CHANGE UP (ref 22–31)
CREAT SERPL-MCNC: 0.77 MG/DL — SIGNIFICANT CHANGE UP (ref 0.5–1.3)
GLUCOSE BLDC GLUCOMTR-MCNC: 108 MG/DL — HIGH (ref 70–99)
GLUCOSE BLDC GLUCOMTR-MCNC: 127 MG/DL — HIGH (ref 70–99)
GLUCOSE BLDC GLUCOMTR-MCNC: 136 MG/DL — HIGH (ref 70–99)
GLUCOSE BLDC GLUCOMTR-MCNC: 148 MG/DL — HIGH (ref 70–99)
GLUCOSE SERPL-MCNC: 114 MG/DL — HIGH (ref 70–99)
HCT VFR BLD CALC: 37.8 % — SIGNIFICANT CHANGE UP (ref 34.5–45)
HGB BLD-MCNC: 11.9 G/DL — SIGNIFICANT CHANGE UP (ref 11.5–15.5)
MCHC RBC-ENTMCNC: 29.3 PG — SIGNIFICANT CHANGE UP (ref 27–34)
MCHC RBC-ENTMCNC: 31.6 GM/DL — LOW (ref 32–36)
MCV RBC AUTO: 92.5 FL — SIGNIFICANT CHANGE UP (ref 80–100)
PLATELET # BLD AUTO: 159 K/UL — SIGNIFICANT CHANGE UP (ref 150–400)
POTASSIUM SERPL-MCNC: 3.3 MMOL/L — LOW (ref 3.5–5.3)
POTASSIUM SERPL-SCNC: 3.3 MMOL/L — LOW (ref 3.5–5.3)
PROT SERPL-MCNC: 6.6 G/DL — SIGNIFICANT CHANGE UP (ref 6–8.3)
RBC # BLD: 4.08 M/UL — SIGNIFICANT CHANGE UP (ref 3.8–5.2)
RBC # FLD: 20.1 % — HIGH (ref 10.3–14.5)
SODIUM SERPL-SCNC: 132 MMOL/L — LOW (ref 135–145)
WBC # BLD: 14.1 K/UL — HIGH (ref 3.8–10.5)
WBC # FLD AUTO: 14.1 K/UL — HIGH (ref 3.8–10.5)

## 2018-02-21 RX ORDER — TRAMADOL HYDROCHLORIDE 50 MG/1
50 TABLET ORAL EVERY 4 HOURS
Qty: 0 | Refills: 0 | Status: DISCONTINUED | OUTPATIENT
Start: 2018-02-21 | End: 2018-02-22

## 2018-02-21 RX ORDER — POTASSIUM CHLORIDE 20 MEQ
40 PACKET (EA) ORAL EVERY 4 HOURS
Qty: 0 | Refills: 0 | Status: COMPLETED | OUTPATIENT
Start: 2018-02-21 | End: 2018-02-21

## 2018-02-21 RX ORDER — TRAMADOL HYDROCHLORIDE 50 MG/1
50 TABLET ORAL ONCE
Qty: 0 | Refills: 0 | Status: DISCONTINUED | OUTPATIENT
Start: 2018-02-21 | End: 2018-02-21

## 2018-02-21 RX ORDER — METOPROLOL TARTRATE 50 MG
25 TABLET ORAL ONCE
Qty: 0 | Refills: 0 | Status: DISCONTINUED | OUTPATIENT
Start: 2018-02-21 | End: 2018-02-21

## 2018-02-21 RX ADMIN — Medication 40 MILLIGRAM(S): at 17:54

## 2018-02-21 RX ADMIN — Medication 40 MILLIEQUIVALENT(S): at 09:10

## 2018-02-21 RX ADMIN — TRAMADOL HYDROCHLORIDE 50 MILLIGRAM(S): 50 TABLET ORAL at 15:45

## 2018-02-21 RX ADMIN — TIOTROPIUM BROMIDE 1 CAPSULE(S): 18 CAPSULE ORAL; RESPIRATORY (INHALATION) at 11:42

## 2018-02-21 RX ADMIN — Medication 40 MILLIGRAM(S): at 06:50

## 2018-02-21 RX ADMIN — TRAMADOL HYDROCHLORIDE 50 MILLIGRAM(S): 50 TABLET ORAL at 04:31

## 2018-02-21 RX ADMIN — TRAMADOL HYDROCHLORIDE 50 MILLIGRAM(S): 50 TABLET ORAL at 19:36

## 2018-02-21 RX ADMIN — INSULIN GLARGINE 30 UNIT(S): 100 INJECTION, SOLUTION SUBCUTANEOUS at 21:40

## 2018-02-21 RX ADMIN — TRAMADOL HYDROCHLORIDE 50 MILLIGRAM(S): 50 TABLET ORAL at 15:16

## 2018-02-21 RX ADMIN — Medication 25 MILLIGRAM(S): at 17:11

## 2018-02-21 RX ADMIN — TRAMADOL HYDROCHLORIDE 50 MILLIGRAM(S): 50 TABLET ORAL at 11:30

## 2018-02-21 RX ADMIN — TRAMADOL HYDROCHLORIDE 50 MILLIGRAM(S): 50 TABLET ORAL at 20:29

## 2018-02-21 RX ADMIN — ENOXAPARIN SODIUM 40 MILLIGRAM(S): 100 INJECTION SUBCUTANEOUS at 11:43

## 2018-02-21 RX ADMIN — TRAMADOL HYDROCHLORIDE 50 MILLIGRAM(S): 50 TABLET ORAL at 10:52

## 2018-02-21 RX ADMIN — Medication 40 MILLIEQUIVALENT(S): at 13:49

## 2018-02-21 NOTE — CHART NOTE - NSCHARTNOTEFT_GEN_A_CORE
MRI findings discussed w/ HemeOnc.      GOC discussed w/ pt and full code.    Attending requested palliative consult-pending

## 2018-02-21 NOTE — GOALS OF CARE CONVERSATION - PERSONAL ADVANCE DIRECTIVE - CONVERSATION DETAILS
Discussed her diagnosis of HCC.  Pt stated her cancer went away w/o treatment.  NP informed pt that her recent MRI 2/20 looks like the cancer has returned.   Pt verbalized understanding.

## 2018-02-21 NOTE — PROGRESS NOTE ADULT - SUBJECTIVE AND OBJECTIVE BOX
Patient is a 63y old  Female who presents with a chief complaint of worsening lower extremity edema (17 Feb 2018 19:27)    PATIENT IS SEEN AND EXAMINED IN MEDICAL FLOOR.    ALLERGIES:  penicillin (Anaphylaxis)    VITALS:    Vital Signs Last 24 Hrs  T(C): 37.2 (21 Feb 2018 20:29), Max: 37.3 (21 Feb 2018 05:39)  T(F): 99 (21 Feb 2018 20:29), Max: 99.1 (21 Feb 2018 05:39)  HR: 102 (21 Feb 2018 20:29) (102 - 136)  BP: 113/78 (21 Feb 2018 20:29) (106/63 - 132/73)  BP(mean): --  RR: 16 (21 Feb 2018 20:29) (16 - 16)  SpO2: 94% (21 Feb 2018 20:29) (93% - 96%)    LABS:  CBC Full  -  ( 21 Feb 2018 07:50 )  WBC Count : 14.1 K/uL  Hemoglobin : 11.9 g/dL  Hematocrit : 37.8 %  Platelet Count - Automated : 159 K/uL  Mean Cell Volume : 92.5 fl  Mean Cell Hemoglobin : 29.3 pg  Mean Cell Hemoglobin Concentration : 31.6 gm/dL  Auto Neutrophil # : x  Auto Lymphocyte # : x  Auto Monocyte # : x  Auto Eosinophil # : x  Auto Basophil # : x  Auto Neutrophil % : x  Auto Lymphocyte % : x  Auto Monocyte % : x  Auto Eosinophil % : x  Auto Basophil % : x    PT/INR - ( 20 Feb 2018 07:19 )   PT: 17.3 sec;   INR: 1.57 ratio         PTT - ( 20 Feb 2018 07:19 )  PTT:29.1 sec  02-21    132<L>  |  96  |  17  ----------------------------<  114<H>  3.3<L>   |  27  |  0.77    Ca    8.7      21 Feb 2018 07:50  Mg     2.2     02-20    TPro  6.6  /  Alb  2.0<L>  /  TBili  5.6<H>  /  DBili  x   /  AST  560<H>  /  ALT  85<H>  /  AlkPhos  290<H>  02-21    CAPILLARY BLOOD GLUCOSE      POCT Blood Glucose.: 127 mg/dL (21 Feb 2018 16:31)  POCT Blood Glucose.: 136 mg/dL (21 Feb 2018 11:53)  POCT Blood Glucose.: 108 mg/dL (21 Feb 2018 07:43)  POCT Blood Glucose.: 160 mg/dL (20 Feb 2018 21:43)        LIVER FUNCTIONS - ( 21 Feb 2018 07:50 )  Alb: 2.0 g/dL / Pro: 6.6 g/dL / ALK PHOS: 290 U/L / ALT: 85 U/L DA / AST: 560 U/L / GGT: x                     MEDICATIONS:    MEDICATIONS  (STANDING):  enoxaparin Injectable 40 milliGRAM(s) SubCutaneous daily  furosemide   Injectable 40 milliGRAM(s) IV Push two times a day  influenza   Vaccine 0.5 milliLiter(s) IntraMuscular once  insulin glargine Injectable (LANTUS) 30 Unit(s) SubCutaneous at bedtime  insulin lispro (HumaLOG) corrective regimen sliding scale   SubCutaneous Before meals and at bedtime  metoprolol     tartrate 25 milliGRAM(s) Oral two times a day  multivitamin 1 Tablet(s) Oral daily  nicotine -  14 mG/24Hr(s) Patch 1 patch Transdermal daily  thiamine 100 milliGRAM(s) Oral daily  tiotropium 18 MICROgram(s) Capsule 1 Capsule(s) Inhalation daily      MEDICATIONS  (PRN):  LORazepam   Injectable 2 milliGRAM(s) IV Push every 6 hours PRN agitation, anxiety, assaultive behavior. CIWA >7  traMADol 50 milliGRAM(s) Oral every 4 hours PRN Moderate Pain (4 - 6)      REVIEW OF SYSTEMS:                           ALL ROS DONE [ X   ]    CONSTITUTIONAL:  LETHARGIC [   ], FEVER [   ], UNRESPONSIVE [   ]  CVS:  CP  [   ], SOB, [   ], PALPITATIONS [   ], DIZZYNESS [   ]  RS: COUGH [   ], SPUTUM [   ]  GI: ABDOMINAL PAIN [   ], NAUSEA [   ], VOMITINGS [   ], DIARRHEA [   ], CONSTIPATION [   ]  :  DYSURIA [   ], NOCTURIA [   ], INCREASED FREQUENCY [   ], DRIBLING [   ],  SKELETAL: PAINFUL JOINTS [   ], SWOLLEN JOINTS [   ], NECK ACHE [   ], LOW BACK ACHE [   ],  SKIN : ULCERS [   ], RASH [   ], ITCHING [   ]  CNS: HEAD ACHE [   ], DOUBLE VISION [   ], BLURRED VISION [   ], AMS / CONFUSION [   ], SEIZURES [   ], WEAKNESS [   ],TINGLING / NUMBNESS [   ]    PHYSICAL EXAMINATION:  GENERAL APPEARANCE: NO DISTRESS  HEENT:  NO PALLOR, NO  JVD,  NO   NODES, NECK SUPPLE  CVS: S1 +, S2 +,   RS: AEEB,  OCCASIONAL  RALES +,   NO RONCHI  ABD: SOFT,  NO, BS +, ascites +  EXT:  PE  ++  SKIN: WARM,   SKELETAL:  ROM ACCEPTABLE  CNS:  AAO X 3   ,  NO DEFICITS    RADIOLOGY :    < from: MR Abdomen w/ IV Cont (02.20.18 @ 18:13) >  IMPRESSION:   Incomplete study. Suboptimal due to absence of intravenous contrast.  Increased size and extent of bilobar hepatic lesions, consistent with   neoplasm.    Increased size of complex lesions in the upper pole of the left kidney,   previously demonstrating low-level enhancement and suspicious for   neoplasm.    < end of copied text >    ASSESSMENT :     Hypervolemia  Bilateral edema of lower extremity  Herpes zoster without complication  Diabetes mellitus type 2, insulin dependent  Hepatocellular carcinoma  Hepatitis C  Varicose veins  Secondary hypertension, hypertension with unspecified goal  Other specified diabetes mellitus with unspecified complications  COPD (chronic obstructive pulmonary disease)  CHF (congestive heart failure)  History of liver biopsy  H/O eye surgery  H/O lumpectomy      PLAN:  HPI:  60 yo F from home , with a hx of HCC involving 2 lobes of liver diagnosed in 6/2016 s/p IR embolization, COPD (not on home O2), IDDM, Hep C , HTN, Herpes Zoster presents to the E.D for worsening lower extremity edema . patient very poor historian and very upset staying IN ED does not want to give any history . patient denies any shortness of breath and able to lie down flat and comfortable appearing . patient complaining of severe pain in legs . patient denies any chest pain , shortness of breath , nausea , vomiting , abdominal pain but denies any blood in stool or any other urinary complaints.    IN the E.D patient vitals are temp-98 , hr-130 and rr-22 and spo2-96 and cxr -no evidence of pulmonary congestion (17 Feb 2018 19:27)    - RECURRENT AND METASTATIC HEPATOCELLULAR CARCINOMA - D/W ONCOLOGY - PATIENT IS APPROPRIATE FOR PALLIATIVE CARE SINCE PROGNOSIS IS POOR  - LYMPHEDEMA OF NB/L EXTREMITIES DUE TO METASTATIC HEPATOCELLULAR MALIGNANCY  - SINUS TACHYCARDIA - OBSERVE ON METOPROLOL  - POOR PROGNOSIS  - PATIENT IS APPROPRIATE FOR PALLIATIVE CARE   - GI AND DVT PROPHYLAXIS  - DR. CLEANING

## 2018-02-22 ENCOUNTER — TRANSCRIPTION ENCOUNTER (OUTPATIENT)
Age: 63
End: 2018-02-22

## 2018-02-22 VITALS
OXYGEN SATURATION: 96 % | TEMPERATURE: 98 F | RESPIRATION RATE: 19 BRPM | HEART RATE: 90 BPM | SYSTOLIC BLOOD PRESSURE: 120 MMHG | DIASTOLIC BLOOD PRESSURE: 71 MMHG

## 2018-02-22 DIAGNOSIS — Z51.5 ENCOUNTER FOR PALLIATIVE CARE: ICD-10-CM

## 2018-02-22 DIAGNOSIS — R52 PAIN, UNSPECIFIED: ICD-10-CM

## 2018-02-22 LAB
ALBUMIN SERPL ELPH-MCNC: 2.1 G/DL — LOW (ref 3.5–5)
ALP SERPL-CCNC: 285 U/L — HIGH (ref 40–120)
ALT FLD-CCNC: 96 U/L DA — HIGH (ref 10–60)
ANION GAP SERPL CALC-SCNC: 11 MMOL/L — SIGNIFICANT CHANGE UP (ref 5–17)
AST SERPL-CCNC: 642 U/L — HIGH (ref 10–40)
BILIRUB SERPL-MCNC: 6.6 MG/DL — HIGH (ref 0.2–1.2)
BUN SERPL-MCNC: 16 MG/DL — SIGNIFICANT CHANGE UP (ref 7–18)
CALCIUM SERPL-MCNC: 9.1 MG/DL — SIGNIFICANT CHANGE UP (ref 8.4–10.5)
CHLORIDE SERPL-SCNC: 97 MMOL/L — SIGNIFICANT CHANGE UP (ref 96–108)
CO2 SERPL-SCNC: 26 MMOL/L — SIGNIFICANT CHANGE UP (ref 22–31)
CREAT SERPL-MCNC: 0.7 MG/DL — SIGNIFICANT CHANGE UP (ref 0.5–1.3)
GLUCOSE BLDC GLUCOMTR-MCNC: 151 MG/DL — HIGH (ref 70–99)
GLUCOSE BLDC GLUCOMTR-MCNC: 66 MG/DL — LOW (ref 70–99)
GLUCOSE SERPL-MCNC: 58 MG/DL — LOW (ref 70–99)
HCT VFR BLD CALC: 39 % — SIGNIFICANT CHANGE UP (ref 34.5–45)
HGB BLD-MCNC: 12.1 G/DL — SIGNIFICANT CHANGE UP (ref 11.5–15.5)
MCHC RBC-ENTMCNC: 28.9 PG — SIGNIFICANT CHANGE UP (ref 27–34)
MCHC RBC-ENTMCNC: 31 GM/DL — LOW (ref 32–36)
MCV RBC AUTO: 93.1 FL — SIGNIFICANT CHANGE UP (ref 80–100)
PLATELET # BLD AUTO: 162 K/UL — SIGNIFICANT CHANGE UP (ref 150–400)
POTASSIUM SERPL-MCNC: 3.8 MMOL/L — SIGNIFICANT CHANGE UP (ref 3.5–5.3)
POTASSIUM SERPL-SCNC: 3.8 MMOL/L — SIGNIFICANT CHANGE UP (ref 3.5–5.3)
PROT SERPL-MCNC: 6.9 G/DL — SIGNIFICANT CHANGE UP (ref 6–8.3)
RBC # BLD: 4.19 M/UL — SIGNIFICANT CHANGE UP (ref 3.8–5.2)
RBC # FLD: 20.9 % — HIGH (ref 10.3–14.5)
SODIUM SERPL-SCNC: 134 MMOL/L — LOW (ref 135–145)
WBC # BLD: 14.9 K/UL — HIGH (ref 3.8–10.5)
WBC # FLD AUTO: 14.9 K/UL — HIGH (ref 3.8–10.5)

## 2018-02-22 PROCEDURE — 94640 AIRWAY INHALATION TREATMENT: CPT

## 2018-02-22 PROCEDURE — 71046 X-RAY EXAM CHEST 2 VIEWS: CPT

## 2018-02-22 PROCEDURE — 80076 HEPATIC FUNCTION PANEL: CPT

## 2018-02-22 PROCEDURE — 83036 HEMOGLOBIN GLYCOSYLATED A1C: CPT

## 2018-02-22 PROCEDURE — 85610 PROTHROMBIN TIME: CPT

## 2018-02-22 PROCEDURE — 90686 IIV4 VACC NO PRSV 0.5 ML IM: CPT

## 2018-02-22 PROCEDURE — 76705 ECHO EXAM OF ABDOMEN: CPT

## 2018-02-22 PROCEDURE — 81001 URINALYSIS AUTO W/SCOPE: CPT

## 2018-02-22 PROCEDURE — 84484 ASSAY OF TROPONIN QUANT: CPT

## 2018-02-22 PROCEDURE — 84145 PROCALCITONIN (PCT): CPT

## 2018-02-22 PROCEDURE — 96374 THER/PROPH/DIAG INJ IV PUSH: CPT

## 2018-02-22 PROCEDURE — 82607 VITAMIN B-12: CPT

## 2018-02-22 PROCEDURE — 85730 THROMBOPLASTIN TIME PARTIAL: CPT

## 2018-02-22 PROCEDURE — 99222 1ST HOSP IP/OBS MODERATE 55: CPT

## 2018-02-22 PROCEDURE — 83735 ASSAY OF MAGNESIUM: CPT

## 2018-02-22 PROCEDURE — 80307 DRUG TEST PRSMV CHEM ANLYZR: CPT

## 2018-02-22 PROCEDURE — 82248 BILIRUBIN DIRECT: CPT

## 2018-02-22 PROCEDURE — 80061 LIPID PANEL: CPT

## 2018-02-22 PROCEDURE — 84100 ASSAY OF PHOSPHORUS: CPT

## 2018-02-22 PROCEDURE — 86301 IMMUNOASSAY TUMOR CA 19-9: CPT

## 2018-02-22 PROCEDURE — 83880 ASSAY OF NATRIURETIC PEPTIDE: CPT

## 2018-02-22 PROCEDURE — 82105 ALPHA-FETOPROTEIN SERUM: CPT

## 2018-02-22 PROCEDURE — 82378 CARCINOEMBRYONIC ANTIGEN: CPT

## 2018-02-22 PROCEDURE — 82962 GLUCOSE BLOOD TEST: CPT

## 2018-02-22 PROCEDURE — 93005 ELECTROCARDIOGRAM TRACING: CPT

## 2018-02-22 PROCEDURE — 85027 COMPLETE CBC AUTOMATED: CPT

## 2018-02-22 PROCEDURE — 99285 EMERGENCY DEPT VISIT HI MDM: CPT | Mod: 25

## 2018-02-22 PROCEDURE — 80053 COMPREHEN METABOLIC PANEL: CPT

## 2018-02-22 PROCEDURE — 84443 ASSAY THYROID STIM HORMONE: CPT

## 2018-02-22 PROCEDURE — 74182 MRI ABDOMEN W/CONTRAST: CPT

## 2018-02-22 RX ORDER — FUROSEMIDE 40 MG
1 TABLET ORAL
Qty: 60 | Refills: 0 | OUTPATIENT
Start: 2018-02-22 | End: 2018-03-23

## 2018-02-22 RX ORDER — AMLODIPINE BESYLATE 2.5 MG/1
1 TABLET ORAL
Qty: 0 | Refills: 0 | COMMUNITY

## 2018-02-22 RX ORDER — INFLUENZA VIRUS VACCINE 15; 15; 15; 15 UG/.5ML; UG/.5ML; UG/.5ML; UG/.5ML
0.5 SUSPENSION INTRAMUSCULAR ONCE
Qty: 0 | Refills: 0 | Status: DISCONTINUED | OUTPATIENT
Start: 2018-02-22 | End: 2018-02-22

## 2018-02-22 RX ORDER — METOPROLOL TARTRATE 50 MG
1 TABLET ORAL
Qty: 60 | Refills: 0 | OUTPATIENT
Start: 2018-02-22 | End: 2018-03-23

## 2018-02-22 RX ORDER — ISOPROPYL ALCOHOL, BENZOCAINE .7; .06 ML/ML; ML/ML
0 SWAB TOPICAL
Qty: 1 | Refills: 0 | OUTPATIENT
Start: 2018-02-22

## 2018-02-22 RX ORDER — ENOXAPARIN SODIUM 100 MG/ML
30 INJECTION SUBCUTANEOUS
Qty: 1 | Refills: 0 | OUTPATIENT
Start: 2018-02-22 | End: 2018-03-23

## 2018-02-22 RX ORDER — TRAMADOL HYDROCHLORIDE 50 MG/1
1 TABLET ORAL
Qty: 180 | Refills: 0 | OUTPATIENT
Start: 2018-02-22 | End: 2018-03-23

## 2018-02-22 RX ORDER — THIAMINE MONONITRATE (VIT B1) 100 MG
1 TABLET ORAL
Qty: 30 | Refills: 0 | OUTPATIENT
Start: 2018-02-22 | End: 2018-03-23

## 2018-02-22 RX ORDER — CYCLOSPORINE 0.5 MG/ML
1 EMULSION OPHTHALMIC
Qty: 1 | Refills: 0 | OUTPATIENT
Start: 2018-02-22 | End: 2018-03-23

## 2018-02-22 RX ADMIN — Medication 25 MILLIGRAM(S): at 06:11

## 2018-02-22 RX ADMIN — Medication 1: at 12:24

## 2018-02-22 RX ADMIN — TRAMADOL HYDROCHLORIDE 50 MILLIGRAM(S): 50 TABLET ORAL at 06:11

## 2018-02-22 RX ADMIN — TRAMADOL HYDROCHLORIDE 50 MILLIGRAM(S): 50 TABLET ORAL at 07:17

## 2018-02-22 RX ADMIN — Medication 40 MILLIGRAM(S): at 06:11

## 2018-02-22 RX ADMIN — TRAMADOL HYDROCHLORIDE 50 MILLIGRAM(S): 50 TABLET ORAL at 01:12

## 2018-02-22 RX ADMIN — INFLUENZA VIRUS VACCINE 0.5 MILLILITER(S): 15; 15; 15; 15 SUSPENSION INTRAMUSCULAR at 13:56

## 2018-02-22 RX ADMIN — ENOXAPARIN SODIUM 40 MILLIGRAM(S): 100 INJECTION SUBCUTANEOUS at 12:24

## 2018-02-22 RX ADMIN — TIOTROPIUM BROMIDE 1 CAPSULE(S): 18 CAPSULE ORAL; RESPIRATORY (INHALATION) at 11:11

## 2018-02-22 RX ADMIN — TRAMADOL HYDROCHLORIDE 50 MILLIGRAM(S): 50 TABLET ORAL at 14:32

## 2018-02-22 RX ADMIN — TRAMADOL HYDROCHLORIDE 50 MILLIGRAM(S): 50 TABLET ORAL at 00:13

## 2018-02-22 RX ADMIN — TRAMADOL HYDROCHLORIDE 50 MILLIGRAM(S): 50 TABLET ORAL at 11:08

## 2018-02-22 NOTE — DISCHARGE NOTE ADULT - CARE PLAN
Principal Discharge DX:	Bilateral edema of lower extremity  Goal:	Adherence to medication  Assessment and plan of treatment:	Continue to take your Lasix as instructed  Secondary Diagnosis:	Hepatocellular carcinoma  Goal:	Hematologist Oncologist Followup  Assessment and plan of treatment:	Your were seen by Dr. Payne in the hospital.

## 2018-02-22 NOTE — CONSULT NOTE ADULT - PROBLEM SELECTOR RECOMMENDATION 3
Attempted to speak with pt in room, beginning with general ROS; however, pt immediately became angry and was verbally abusive; stating "write me my pain scripts and send me home." Unable to proceed with goals of care/advance directive discussion. Pt hospice eligible if agreeable. Pt remains a full code. Attempted to speak with pt in room, beginning with general ROS; however, pt immediately became angry and became visibly agitated; stating "write me my pain scripts and send me home." Unable to proceed with goals of care/advance directive discussion. Pt hospice eligible if agreeable. Pt remains a full code.

## 2018-02-22 NOTE — DISCHARGE NOTE ADULT - CARE PROVIDER_API CALL
Lizett Flores  [address] [place] [address]  Phone: [phone]  Fax: (   )    -    Radha Payne), Internal Medicine; Medical Oncology  [address] [place] [address]  Phone: [phone]  Fax: [phone]

## 2018-02-22 NOTE — DISCHARGE NOTE ADULT - PROVIDER TOKENS
FREE:[LAST:[Mark],FIRST:[Lizett],PHONE:[phone]],FAX:[(   [phone]    -],ADDRESS:[[address] [place] [address]]],TOKEN:'[email or web address]'

## 2018-02-22 NOTE — DISCHARGE NOTE ADULT - MEDICATION SUMMARY - MEDICATIONS TO TAKE
I will START or STAY ON the medications listed below when I get home from the hospital:    alcohol swabs  -- Use as directed Disp: 1 box  -- Indication: For Diabetes mellitus type 2, insulin dependent    Blood Glucose Test strips  -- Test blood sugar 4 x daily    Disp: 3 boxes  -- Indication: For Diabetes mellitus type 2, insulin dependent    Insulin Pen Needles  -- Use as directed    Disp: 3 boxes  -- Indication: For Diabetes mellitus type 2, insulin dependent    Lancets  -- Test blood sugar 4x daily    Disp 3 boxes  -- Indication: For Diabetes mellitus type 2, insulin dependent    traMADol 50 mg oral tablet  -- 1 tab(s) by mouth every 4 hours, As needed, Moderate Pain (4 - 6) MDD:6  -- Indication: For Pain    HumaLOG KwikPen 100 units/mL subcutaneous solution  -- 7 unit(s) subcutaneous 3 times a day before meals  -- Do not drink alcoholic beverages when taking this medication.  It is very important that you take or use this exactly as directed.  Do not skip doses or discontinue unless directed by your doctor.  Keep in refrigerator.  Do not freeze.    -- Indication: For Diabetes mellitus type 2, insulin dependent    Lantus Solostar Pen 100 units/mL subcutaneous solution  -- 30 unit(s) subcutaneous once a day (at bedtime)  -- Do not drink alcoholic beverages when taking this medication.  It is very important that you take or use this exactly as directed.  Do not skip doses or discontinue unless directed by your doctor.  Keep in refrigerator.  Do not freeze.    -- Indication: For Diabetes mellitus type 2, insulin dependent    metoprolol tartrate 25 mg oral tablet  -- 1 tab(s) by mouth 2 times a day  -- Indication: For HTN (hypertension)    tiotropium 18 mcg inhalation capsule  -- 1 cap(s) inhaled once a day  -- Indication: For COPD (chronic obstructive pulmonary disease)    furosemide 40 mg oral tablet  -- 1 tab(s) by mouth 2 times a day  -- Indication: For Edema    Restasis 0.05% ophthalmic emulsion  -- 1 drop(s) to each affected eye every 12 hours  -- Indication: For Eyes    Artificial Tears ophthalmic solution  -- 1 drop(s) to each affected eye 2 times a day  -- Indication: For Eyes    One-A-Day 50+ oral tablet  -- 1 tab(s) by mouth once a day   -- Indication: For Health Maintenance    Vitamin B1 100 mg oral tablet  -- 1 tab(s) by mouth once a day  -- Indication: For Health Maintenance I will START or STAY ON the medications listed below when I get home from the hospital:    alcohol swabs  -- Use as directed Disp: 1 box  -- Indication: For Diabetes mellitus type 2, insulin dependent    Blood Glucose Test strips  -- Test blood sugar 4 x daily    Disp: 3 boxes  -- Indication: For Diabetes mellitus type 2, insulin dependent    Insulin Pen Needles  -- Use as directed    Disp: 3 boxes  -- Indication: For Diabetes mellitus type 2, insulin dependent    Lancets  -- Test blood sugar 4x daily    Disp 3 boxes  -- Indication: For Diabetes mellitus type 2, insulin dependent    traMADol 50 mg oral tablet  -- 1 tab(s) by mouth every 4 hours, As needed, Moderate Pain (4 - 6) MDD:6  -- Indication: For Pain    Lantus Solostar Pen 100 units/mL subcutaneous solution  -- 30 unit(s) subcutaneous once a day (at bedtime)  -- Do not drink alcoholic beverages when taking this medication.  It is very important that you take or use this exactly as directed.  Do not skip doses or discontinue unless directed by your doctor.  Keep in refrigerator.  Do not freeze.    -- Indication: For Diabetes mellitus type 2, insulin dependent    HumaLOG KwikPen 100 units/mL subcutaneous solution  -- 7 unit(s) subcutaneous 3 times a day before meals  -- Do not drink alcoholic beverages when taking this medication.  It is very important that you take or use this exactly as directed.  Do not skip doses or discontinue unless directed by your doctor.  Keep in refrigerator.  Do not freeze.    -- Indication: For Diabetes mellitus type 2, insulin dependent    metoprolol tartrate 25 mg oral tablet  -- 1 tab(s) by mouth 2 times a day  -- Indication: For HTN (hypertension)    tiotropium 18 mcg inhalation capsule  -- 1 cap(s) inhaled once a day  -- Indication: For COPD (chronic obstructive pulmonary disease)    furosemide 40 mg oral tablet  -- 1 tab(s) by mouth 2 times a day  -- Indication: For Edema    Restasis 0.05% ophthalmic emulsion  -- 1 drop(s) to each affected eye every 12 hours  -- Indication: For Eyes    Artificial Tears ophthalmic solution  -- 1 drop(s) to each affected eye 2 times a day  -- Indication: For Eyes    One-A-Day 50+ oral tablet  -- 1 tab(s) by mouth once a day   -- Indication: For Health Maintenance    Vitamin B1 100 mg oral tablet  -- 1 tab(s) by mouth once a day  -- Indication: For Health Maintenance

## 2018-02-22 NOTE — CONSULT NOTE ADULT - SUBJECTIVE AND OBJECTIVE BOX
HPI:  62 y/o F from home , with a hx of HCC involving 2 lobes of liver diagnosed in 6/2016 s/p IR embolization, COPD (not on home O2), IDDM, Hep C , HTN, Herpes Zoster presents to the E.D for worsening lower extremity edema. Pt tested positive for cocaine metabolite in urine.     PAST MEDICAL & SURGICAL HISTORY:  Bilateral edema of lower extremity  Herpes zoster without complication  Diabetes mellitus type 2, insulin dependent  Hepatocellular carcinoma  Hepatitis C  Varicose veins  Secondary hypertension, hypertension with unspecified goal  Other specified diabetes mellitus with unspecified complications  COPD (chronic obstructive pulmonary disease)  History of liver biopsy  H/O eye surgery  H/O lumpectomy      SOCIAL HISTORY:    Admitted from:  home assisted living [facility]   Substance abuse history:              Tobacco hx:                  Alcohol hx:              Home Opioid hx:  [employer]:                                    Preferred Language:    Surrogate/HCP/Guardian:            Phone#:    FAMILY HISTORY:  Family history of coronary artery disease (Father)  Family history of diabetes mellitus (Mother)    Baseline ADLs (prior to admission):    Allergies    penicillin (Anaphylaxis)    Intolerances      Present Symptoms:   Dyspnea:   Nausea/Vomiting:   Anxiety:  Depressed   Fatigue:  Loss of appetite:   Pain:                                location:          Review of Systems: [All others negative or Unable to obtain due to poor mentation]    MEDICATIONS  (STANDING):  enoxaparin Injectable 40 milliGRAM(s) SubCutaneous daily  furosemide   Injectable 40 milliGRAM(s) IV Push two times a day  influenza   Vaccine 0.5 milliLiter(s) IntraMuscular once  insulin glargine Injectable (LANTUS) 30 Unit(s) SubCutaneous at bedtime  insulin lispro (HumaLOG) corrective regimen sliding scale   SubCutaneous Before meals and at bedtime  metoprolol     tartrate 25 milliGRAM(s) Oral two times a day  multivitamin 1 Tablet(s) Oral daily  nicotine -  14 mG/24Hr(s) Patch 1 patch Transdermal daily  thiamine 100 milliGRAM(s) Oral daily  tiotropium 18 MICROgram(s) Capsule 1 Capsule(s) Inhalation daily    MEDICATIONS  (PRN):  LORazepam   Injectable 2 milliGRAM(s) IV Push every 6 hours PRN agitation, anxiety, assaultive behavior. CIWA >7  traMADol 50 milliGRAM(s) Oral every 4 hours PRN Moderate Pain (4 - 6)      PHYSICAL EXAM:    Vital Signs Last 24 Hrs  T(C): 36.8 (22 Feb 2018 06:01), Max: 37.2 (21 Feb 2018 20:29)  T(F): 98.3 (22 Feb 2018 06:01), Max: 99 (21 Feb 2018 20:29)  HR: 116 (22 Feb 2018 06:01) (102 - 136)  BP: 117/75 (22 Feb 2018 06:01) (113/78 - 132/73)  BP(mean): --  RR: 16 (22 Feb 2018 06:01) (16 - 16)  SpO2: 92% (22 Feb 2018 06:01) (92% - 96%)    General: alert  oriented x ____    [lethargic distressed cachexia  nonverbal  unarousable verbal]  Karnofsky Performance Score/Palliative Performance Status Version2:     %    HEENT: normal  dry mouth  ET tube/trach oral lesions:  Lungs: comfortable tachypnea/labored breathing  excessive secretions  CV: normal  tachycardia  GI: normal  distended  tender  incontinent               PEG/NG/OG tube  constipation  last BM:   : normal  incontinent  oliguria/anuria  fernandez  Musculoskeletal: normal  weakness  edema             ambulatory  bedbound/wheelchair bound  Skin: normal  pressure ulcers: stage: edema: other:  Neuro: no deficits cognitive impairment dsyphagia/dysarthria paresis: other:  Oral intake ability: unable/only mouth care [minimal moderate full capability]  Diet: [NPO]    LABS:                        12.1   14.9  )-----------( 162      ( 22 Feb 2018 07:29 )             39.0     02-22    134<L>  |  97  |  16  ----------------------------<  58<L>  3.8   |  26  |  0.70    Ca    9.1      22 Feb 2018 07:29    TPro  6.9  /  Alb  2.1<L>  /  TBili  6.6<H>  /  DBili  x   /  AST  642<H>  /  ALT  96<H>  /  AlkPhos  285<H>  02-22        RADIOLOGY & ADDITIONAL STUDIES:    ADVANCE DIRECTIVES:   Advanced Care Planning discussion total time spent: HPI:  62 y/o F from home with hx of HCC involving 2 lobes of liver diagnosed in 6/2016 s/p IR embolization, COPD (not on home O2), IDDM, Hep C , HTN, Herpes Zoster presents to the E.D for worsening lower extremity edema. Pt tested positive for cocaine metabolite in urine. Poor prognosis. Full code on file. Need to discuss goals of care.     PAST MEDICAL & SURGICAL HISTORY:  Bilateral edema of lower extremity  Herpes zoster without complication  Diabetes mellitus type 2, insulin dependent  Hepatocellular carcinoma  Hepatitis C  Varicose veins  Secondary hypertension, hypertension with unspecified goal  Other specified diabetes mellitus with unspecified complications  COPD (chronic obstructive pulmonary disease)  History of liver biopsy  H/O eye surgery  H/O lumpectomy      SOCIAL HISTORY:    Admitted from:  home   Substance abuse history:              Tobacco hx:                  Alcohol hx:              Home Opioid hx:  [employer]:                                    Preferred Language: English    Surrogate/HCP/Guardian:            Phone#:    FAMILY HISTORY:  Family history of coronary artery disease (Father)  Family history of diabetes mellitus (Mother)    Baseline ADLs (prior to admission):    Allergies:  penicillin (Anaphylaxis)      Present Symptoms:   Dyspnea: none  Pain: pt reports pain in legs 2/2 edema                                          Review of Systems: Limited ROS due to pt refusal    MEDICATIONS  (STANDING):  enoxaparin Injectable 40 milliGRAM(s) SubCutaneous daily  furosemide   Injectable 40 milliGRAM(s) IV Push two times a day  influenza   Vaccine 0.5 milliLiter(s) IntraMuscular once  insulin glargine Injectable (LANTUS) 30 Unit(s) SubCutaneous at bedtime  insulin lispro (HumaLOG) corrective regimen sliding scale   SubCutaneous Before meals and at bedtime  metoprolol     tartrate 25 milliGRAM(s) Oral two times a day  multivitamin 1 Tablet(s) Oral daily  nicotine -  14 mG/24Hr(s) Patch 1 patch Transdermal daily  thiamine 100 milliGRAM(s) Oral daily  tiotropium 18 MICROgram(s) Capsule 1 Capsule(s) Inhalation daily    MEDICATIONS  (PRN):  LORazepam   Injectable 2 milliGRAM(s) IV Push every 6 hours PRN agitation, anxiety, assaultive behavior. CIWA >7  traMADol 50 milliGRAM(s) Oral every 4 hours PRN Moderate Pain (4 - 6)      PHYSICAL EXAM:    Vital Signs Last 24 Hrs  T(C): 36.8 (22 Feb 2018 06:01), Max: 37.2 (21 Feb 2018 20:29)  T(F): 98.3 (22 Feb 2018 06:01), Max: 99 (21 Feb 2018 20:29)  HR: 116 (22 Feb 2018 06:01) (102 - 136)  BP: 117/75 (22 Feb 2018 06:01) (113/78 - 132/73)  BP(mean): --  RR: 16 (22 Feb 2018 06:01) (16 - 16)  SpO2: 92% (22 Feb 2018 06:01) (92% - 96%)    General: alert  oriented x 3, pt angry, verbally abusive - limited ROS/physical exam.   Karnofsky Performance Score/Palliative Performance Status Version2:    40 %    HEENT: normal    Lungs: comfortable, on RA  CV:   tachycardia  GI: large, round  : normal  incontinent  oliguria/anuria  fernandez  Musculoskeletal: ambulatory  Skin: +2 BLE edema, jaudice  Neuro: no deficits, pt angry, verbally abusive   Oral intake ability: full capability  Diet: DASH/TLC    LABS:                        12.1   14.9  )-----------( 162      ( 22 Feb 2018 07:29 )             39.0     02-22    134<L>  |  97  |  16  ----------------------------<  58<L>  3.8   |  26  |  0.70    Ca    9.1      22 Feb 2018 07:29    TPro  6.9  /  Alb  2.1<L>  /  TBili  6.6<H>  /  DBili  x   /  AST  642<H>  /  ALT  96<H>  /  AlkPhos  285<H>  02-22        RADIOLOGY & ADDITIONAL STUDIES:      ADVANCE DIRECTIVES: Full code HPI:  62 y/o F from home with hx of HCC involving 2 lobes of liver diagnosed in 6/2016 s/p IR embolization, COPD (not on home O2), IDDM, Hep C , HTN, Herpes Zoster presents to the E.D for worsening lower extremity edema. Pt's urine tested positive for cocaine metabolite. Poor prognosis. Full code on file. Need to discuss goals of care.     PAST MEDICAL & SURGICAL HISTORY:  Bilateral edema of lower extremity  Herpes zoster without complication  Diabetes mellitus type 2, insulin dependent  Hepatocellular carcinoma  Hepatitis C  Varicose veins  Secondary hypertension, hypertension with unspecified goal  Other specified diabetes mellitus with unspecified complications  COPD (chronic obstructive pulmonary disease)  History of liver biopsy  H/O eye surgery  H/O lumpectomy      SOCIAL HISTORY:    Admitted from:  home   Substance abuse history:    Pt's urine tested positive for cocaine metabolite. When previously asked by social work about frequency/type of substance use pt responded "everything in  moderation".           Orthodoxy:                                    Preferred Language: English    Surrogate/HCP/Guardian:    Oswaldo Guevara: [phone]             FAMILY HISTORY:  Family history of coronary artery disease (Father)  Family history of diabetes mellitus (Mother)    Baseline ADLs (prior to admission):    Allergies:  penicillin (Anaphylaxis)      Present Symptoms:   Dyspnea: none  Pain: pt reports pain in legs 2/2 edema                                          Review of Systems: Limited ROS due to pt refusal    MEDICATIONS  (STANDING):  enoxaparin Injectable 40 milliGRAM(s) SubCutaneous daily  furosemide   Injectable 40 milliGRAM(s) IV Push two times a day  influenza   Vaccine 0.5 milliLiter(s) IntraMuscular once  insulin glargine Injectable (LANTUS) 30 Unit(s) SubCutaneous at bedtime  insulin lispro (HumaLOG) corrective regimen sliding scale   SubCutaneous Before meals and at bedtime  metoprolol     tartrate 25 milliGRAM(s) Oral two times a day  multivitamin 1 Tablet(s) Oral daily  nicotine -  14 mG/24Hr(s) Patch 1 patch Transdermal daily  thiamine 100 milliGRAM(s) Oral daily  tiotropium 18 MICROgram(s) Capsule 1 Capsule(s) Inhalation daily    MEDICATIONS  (PRN):  LORazepam   Injectable 2 milliGRAM(s) IV Push every 6 hours PRN agitation, anxiety, assaultive behavior. CIWA >7  traMADol 50 milliGRAM(s) Oral every 4 hours PRN Moderate Pain (4 - 6)      PHYSICAL EXAM:    Vital Signs Last 24 Hrs  T(C): 36.8 (22 Feb 2018 06:01), Max: 37.2 (21 Feb 2018 20:29)  T(F): 98.3 (22 Feb 2018 06:01), Max: 99 (21 Feb 2018 20:29)  HR: 116 (22 Feb 2018 06:01) (102 - 136)  BP: 117/75 (22 Feb 2018 06:01) (113/78 - 132/73)  BP(mean): --  RR: 16 (22 Feb 2018 06:01) (16 - 16)  SpO2: 92% (22 Feb 2018 06:01) (92% - 96%)    General: alert  oriented x 3, pt angry and became visibly agitated -limited ROS/physical exam.   Karnofsky Performance Score/Palliative Performance Status Version2:    40 %    HEENT: normal    Lungs: comfortable, on RA  CV:   tachycardia  GI: large, round  : normal  incontinent  oliguria/anuria  fernandez  Musculoskeletal: ambulatory  Skin: +2 BLE edema, jaudice  Neuro: no deficits, pt angry, verbally abusive   Oral intake ability: full capability  Diet: DASH/TLC    LABS:                        12.1   14.9  )-----------( 162      ( 22 Feb 2018 07:29 )             39.0     02-22    134<L>  |  97  |  16  ----------------------------<  58<L>  3.8   |  26  |  0.70    Ca    9.1      22 Feb 2018 07:29    TPro  6.9  /  Alb  2.1<L>  /  TBili  6.6<H>  /  DBili  x   /  AST  642<H>  /  ALT  96<H>  /  AlkPhos  285<H>  02-22        RADIOLOGY & ADDITIONAL STUDIES:      ADVANCE DIRECTIVES: Full code HPI:  62 y/o F from home with hx of HCC involving 2 lobes of liver diagnosed in 6/2016 s/p IR embolization, COPD (not on home O2), IDDM, Hep C , HTN, Herpes Zoster presents to the E.D for worsening lower extremity edema. Pt's urine tested positive for cocaine metabolite. Poor prognosis. Full code on file. Need to discuss goals of care.     PAST MEDICAL & SURGICAL HISTORY:  Bilateral edema of lower extremity  Herpes zoster without complication  Diabetes mellitus type 2, insulin dependent  Hepatocellular carcinoma  Hepatitis C  Varicose veins  Secondary hypertension, hypertension with unspecified goal  Other specified diabetes mellitus with unspecified complications  COPD (chronic obstructive pulmonary disease)  History of liver biopsy  H/O eye surgery  H/O lumpectomy      SOCIAL HISTORY:    Admitted from:  home   Substance abuse history:    Pt's urine tested positive for cocaine metabolite. When previously asked by social work about frequency/type of substance use pt responded "everything in  moderation".           Preferred Language: English    Surrogate/HCP/Guardian:    Oswaldo Guevara: [phone]             FAMILY HISTORY:  Family history of coronary artery disease (Father)  Family history of diabetes mellitus (Mother)    Baseline ADLs (prior to admission): independent    Allergies:  penicillin (Anaphylaxis)      Present Symptoms:   Dyspnea: none  Pain: pt reports pain in legs 2/2 edema                                          Review of Systems: Limited ROS due to pt refusal    MEDICATIONS  (STANDING):  enoxaparin Injectable 40 milliGRAM(s) SubCutaneous daily  furosemide   Injectable 40 milliGRAM(s) IV Push two times a day  influenza   Vaccine 0.5 milliLiter(s) IntraMuscular once  insulin glargine Injectable (LANTUS) 30 Unit(s) SubCutaneous at bedtime  insulin lispro (HumaLOG) corrective regimen sliding scale   SubCutaneous Before meals and at bedtime  metoprolol     tartrate 25 milliGRAM(s) Oral two times a day  multivitamin 1 Tablet(s) Oral daily  nicotine -  14 mG/24Hr(s) Patch 1 patch Transdermal daily  thiamine 100 milliGRAM(s) Oral daily  tiotropium 18 MICROgram(s) Capsule 1 Capsule(s) Inhalation daily    MEDICATIONS  (PRN):  LORazepam   Injectable 2 milliGRAM(s) IV Push every 6 hours PRN agitation, anxiety, assaultive behavior. CIWA >7  traMADol 50 milliGRAM(s) Oral every 4 hours PRN Moderate Pain (4 - 6)      PHYSICAL EXAM:    Vital Signs Last 24 Hrs  T(C): 36.8 (22 Feb 2018 06:01), Max: 37.2 (21 Feb 2018 20:29)  T(F): 98.3 (22 Feb 2018 06:01), Max: 99 (21 Feb 2018 20:29)  HR: 116 (22 Feb 2018 06:01) (102 - 136)  BP: 117/75 (22 Feb 2018 06:01) (113/78 - 132/73)  BP(mean): --  RR: 16 (22 Feb 2018 06:01) (16 - 16)  SpO2: 92% (22 Feb 2018 06:01) (92% - 96%)    General: alert  oriented x 3, pt angry and became visibly agitated -limited ROS/physical exam.   Karnofsky Performance Score/Palliative Performance Status Version2:    40 %    HEENT: normal    Lungs: comfortable, on RA  CV:   tachycardia  GI: + ascites  Musculoskeletal: ambulatory, independent of ADLs  Skin: +2 BLE edema, jaudice  Neuro: no deficits, pt angry and became visibly agitated   Oral intake ability: full capability  Diet: DASH/TLC    LABS:                        12.1   14.9  )-----------( 162      ( 22 Feb 2018 07:29 )             39.0     02-22    134<L>  |  97  |  16  ----------------------------<  58<L>  3.8   |  26  |  0.70    Ca    9.1      22 Feb 2018 07:29    TPro  6.9  /  Alb  2.1<L>  /  TBili  6.6<H>  /  DBili  x   /  AST  642<H>  /  ALT  96<H>  /  AlkPhos  285<H>  02-22        RADIOLOGY & ADDITIONAL STUDIES:  < from: MR Abdomen w/ IV Cont (02.20.18 @ 18:13) >  EXAM:  MR ABDOMEN IC                            PROCEDURE DATE:  02/20/2018          INTERPRETATION:  CLINICAL INFORMATION: Hepatitis C, COPD,   insulin-dependent diabetes mellitus.    COMPARISON: June 12, 2016.    PROCEDURE:   MRI of the abdomen was performed without intravenous contrast. Incomplete   study. Patient declined further imaging.    FINDINGS:  Incomplete study.  LOWER CHEST: Trace bilateral pleural effusions.    LIVER: Cirrhosis. Multiple bilobar hepatic lesions. Largest lesion   occupying the majority of the right hepatic lobe measures 15.8 x 13.2 cm.   A representative lesion in the left hepatic lobe in segment 4A (series 4,   image 15), measures 2.7 x 2.6 cm.  BILE DUCTS: Normal caliber.  GALLBLADDER: Within normal limits.  SPLEEN: Within normal limits.  PANCREAS: Within normal limits.  ADRENALS: Within normal limits.  KIDNEYS/URETERS: Increased size of complex lesion in the left upper pole,   measuring 3.0 x 2.7 cm, previously measuring 2.0 x 1.6 cm and   demonstrating enhancing septations. Adjacent lesion (series 11, image   49), measuring 1.9 x 1.5 cm measures 1.6 x 1.5 cm.    VISUALIZED PORTIONS:    BOWEL: Within normal limits.   PERITONEUM: No ascites.  VESSELS: Within normal limits.  RETROPERITONEUM: No lymphadenopathy.    ABDOMINAL WALL: Within normal limits.  BONES: Within normal limits.    IMPRESSION:   Incomplete study. Suboptimal due to absence of intravenous contrast.  Increased size and extent of bilobar hepatic lesions, consistent with   neoplasm.    Increased size of complex lesions in the upper pole of the left kidney,   previously demonstrating low-level enhancement and suspicious for   neoplasm.      < end of copied text >    < from: US Hepatic & Pancreatic (02.17.18 @ 20:28) >  EXAM:  US LIVER AND PANCREAS                            PROCEDURE DATE:  02/17/2018          INTERPRETATION:  Clinical indication: Abdominal pain, history of   cirrhosis.    Technique: Targeted right upper quadrant ultrasound of the abdomen was   performed.      Comparison: MR 6/12/2016. CT 6/9/2016.     Findings: This study was technically difficult due to overlying bowel gas.    LIVER: 17.0 cm in length. Heterogeneous, diffusely increased echotexture,   which may reflect fatty infiltration/cirrhosis. Indeterminate lesions,   for example, 12.5 x 14.0 x 13.8 cm in the right lobe and 3.2 x 2.6 x 2.4   cm in the left lobe.   BILIARY: No intrahepatic or extrahepatic biliary dilatation. Visualized   common bile duct measuring up to 0.5 cm.   GALLBLADDER: No gallstone. Nonspecific gallbladder wall thickening   measuring up to 0.5 cm. No pericholecystic fluid. Negative sonographic   Esparza sign.    PANCREAS: Not visualized due to bowel gas.  RIGHT KIDNEY: 10.0 cm in length. No hydronephrosis or obvious renal   stone.   ADDITIONAL: No ascites.     Impression:      Indeterminate hepatic lesions, especially in the right lobe as described,   suspicious for progression of neoplasm. Recommend correlation with   previous interval outside study. Follow-up contrast-enhanced cross   sectional imaging (preferably MRI) would be helpful for further   evaluation.     Nonspecific gallbladder wall thickening. No cholelithiasis or sonographic   evidence of acute cholecystitis.     The pancreas was notvisualized, limiting evaluation.      < end of copied text >        ADVANCE DIRECTIVES: Full code HPI:  62 y/o F from home with hx of HCC involving 2 lobes of liver diagnosed in 6/2016 s/p IR embolization, COPD (not on home O2), IDDM, Hep C , HTN, Herpes Zoster presents to the E.D for worsening lower extremity edema. Pt's urine tested positive for cocaine metabolite. Poor prognosis. Full code on file. Need to discuss goals of care.     PAST MEDICAL & SURGICAL HISTORY:  Bilateral edema of lower extremity  Herpes zoster without complication  Diabetes mellitus type 2, insulin dependent  Hepatocellular carcinoma  Hepatitis C  Varicose veins  Secondary hypertension, hypertension with unspecified goal  Other specified diabetes mellitus with unspecified complications  COPD (chronic obstructive pulmonary disease)  History of liver biopsy  H/O eye surgery  H/O lumpectomy      SOCIAL HISTORY:    Admitted from:  home, lives with Oswaldo  Substance abuse history:    Pt's urine tested positive for cocaine metabolite. When previously asked by social work about frequency/type of substance use pt responded "everything in  moderation".           Preferred Language: English    Surrogate/HCP/Guardian:    Oswaldo Paola: [phone]             FAMILY HISTORY:  Family history of coronary artery disease (Father)  Family history of diabetes mellitus (Mother)    Baseline ADLs (prior to admission): independent    Allergies:  penicillin (Anaphylaxis)      Present Symptoms:   Dyspnea: none  Pain: pt reports pain in legs 2/2 edema                                          Review of Systems: Limited ROS due to pt refusal    MEDICATIONS  (STANDING):  enoxaparin Injectable 40 milliGRAM(s) SubCutaneous daily  furosemide   Injectable 40 milliGRAM(s) IV Push two times a day  influenza   Vaccine 0.5 milliLiter(s) IntraMuscular once  insulin glargine Injectable (LANTUS) 30 Unit(s) SubCutaneous at bedtime  insulin lispro (HumaLOG) corrective regimen sliding scale   SubCutaneous Before meals and at bedtime  metoprolol     tartrate 25 milliGRAM(s) Oral two times a day  multivitamin 1 Tablet(s) Oral daily  nicotine -  14 mG/24Hr(s) Patch 1 patch Transdermal daily  thiamine 100 milliGRAM(s) Oral daily  tiotropium 18 MICROgram(s) Capsule 1 Capsule(s) Inhalation daily    MEDICATIONS  (PRN):  LORazepam   Injectable 2 milliGRAM(s) IV Push every 6 hours PRN agitation, anxiety, assaultive behavior. CIWA >7  traMADol 50 milliGRAM(s) Oral every 4 hours PRN Moderate Pain (4 - 6)      PHYSICAL EXAM:    Vital Signs Last 24 Hrs  T(C): 36.8 (22 Feb 2018 06:01), Max: 37.2 (21 Feb 2018 20:29)  T(F): 98.3 (22 Feb 2018 06:01), Max: 99 (21 Feb 2018 20:29)  HR: 116 (22 Feb 2018 06:01) (102 - 136)  BP: 117/75 (22 Feb 2018 06:01) (113/78 - 132/73)  BP(mean): --  RR: 16 (22 Feb 2018 06:01) (16 - 16)  SpO2: 92% (22 Feb 2018 06:01) (92% - 96%)    General: alert  oriented x 3, pt angry and became visibly agitated -limited ROS/physical exam.   Karnofsky Performance Score/Palliative Performance Status Version2:    40 %    HEENT: normal    Lungs: comfortable, on RA  CV:   tachycardia  GI: + ascites  Musculoskeletal: ambulatory, independent of ADLs  Skin: +2 BLE edema, jaundice  Neuro: no deficits, pt angry and became visibly agitated   Oral intake ability: full capability  Diet: DASH/TLC    LABS:                        12.1   14.9  )-----------( 162      ( 22 Feb 2018 07:29 )             39.0     02-22    134<L>  |  97  |  16  ----------------------------<  58<L>  3.8   |  26  |  0.70    Ca    9.1      22 Feb 2018 07:29    TPro  6.9  /  Alb  2.1<L>  /  TBili  6.6<H>  /  DBili  x   /  AST  642<H>  /  ALT  96<H>  /  AlkPhos  285<H>  02-22        RADIOLOGY & ADDITIONAL STUDIES:  < from: MR Abdomen w/ IV Cont (02.20.18 @ 18:13) >  EXAM:  MR ABDOMEN IC                            PROCEDURE DATE:  02/20/2018          INTERPRETATION:  CLINICAL INFORMATION: Hepatitis C, COPD,   insulin-dependent diabetes mellitus.    COMPARISON: June 12, 2016.    PROCEDURE:   MRI of the abdomen was performed without intravenous contrast. Incomplete   study. Patient declined further imaging.    FINDINGS:  Incomplete study.  LOWER CHEST: Trace bilateral pleural effusions.    LIVER: Cirrhosis. Multiple bilobar hepatic lesions. Largest lesion   occupying the majority of the right hepatic lobe measures 15.8 x 13.2 cm.   A representative lesion in the left hepatic lobe in segment 4A (series 4,   image 15), measures 2.7 x 2.6 cm.  BILE DUCTS: Normal caliber.  GALLBLADDER: Within normal limits.  SPLEEN: Within normal limits.  PANCREAS: Within normal limits.  ADRENALS: Within normal limits.  KIDNEYS/URETERS: Increased size of complex lesion in the left upper pole,   measuring 3.0 x 2.7 cm, previously measuring 2.0 x 1.6 cm and   demonstrating enhancing septations. Adjacent lesion (series 11, image   49), measuring 1.9 x 1.5 cm measures 1.6 x 1.5 cm.    VISUALIZED PORTIONS:    BOWEL: Within normal limits.   PERITONEUM: No ascites.  VESSELS: Within normal limits.  RETROPERITONEUM: No lymphadenopathy.    ABDOMINAL WALL: Within normal limits.  BONES: Within normal limits.    IMPRESSION:   Incomplete study. Suboptimal due to absence of intravenous contrast.  Increased size and extent of bilobar hepatic lesions, consistent with   neoplasm.    Increased size of complex lesions in the upper pole of the left kidney,   previously demonstrating low-level enhancement and suspicious for   neoplasm.      < end of copied text >    < from: US Hepatic & Pancreatic (02.17.18 @ 20:28) >  EXAM:  US LIVER AND PANCREAS                            PROCEDURE DATE:  02/17/2018          INTERPRETATION:  Clinical indication: Abdominal pain, history of   cirrhosis.    Technique: Targeted right upper quadrant ultrasound of the abdomen was   performed.      Comparison: MR 6/12/2016. CT 6/9/2016.     Findings: This study was technically difficult due to overlying bowel gas.    LIVER: 17.0 cm in length. Heterogeneous, diffusely increased echotexture,   which may reflect fatty infiltration/cirrhosis. Indeterminate lesions,   for example, 12.5 x 14.0 x 13.8 cm in the right lobe and 3.2 x 2.6 x 2.4   cm in the left lobe.   BILIARY: No intrahepatic or extrahepatic biliary dilatation. Visualized   common bile duct measuring up to 0.5 cm.   GALLBLADDER: No gallstone. Nonspecific gallbladder wall thickening   measuring up to 0.5 cm. No pericholecystic fluid. Negative sonographic   Esparza sign.    PANCREAS: Not visualized due to bowel gas.  RIGHT KIDNEY: 10.0 cm in length. No hydronephrosis or obvious renal   stone.   ADDITIONAL: No ascites.     Impression:      Indeterminate hepatic lesions, especially in the right lobe as described,   suspicious for progression of neoplasm. Recommend correlation with   previous interval outside study. Follow-up contrast-enhanced cross   sectional imaging (preferably MRI) would be helpful for further   evaluation.     Nonspecific gallbladder wall thickening. No cholelithiasis or sonographic   evidence of acute cholecystitis.     The pancreas was notvisualized, limiting evaluation.      < end of copied text >        ADVANCE DIRECTIVES: Full code

## 2018-02-22 NOTE — DISCHARGE NOTE ADULT - PATIENT PORTAL LINK FT
You can access the [facility] Patient Portal, offered by [facility], by registering with the following website: [email or web address]

## 2018-02-22 NOTE — DISCHARGE NOTE ADULT - PLAN OF CARE
Adherence to medication Continue to take your Lasix as instructed Hematologist Oncologist Followup Your were seen by Dr. Payne in the hospital.

## 2018-02-22 NOTE — PROGRESS NOTE ADULT - SUBJECTIVE AND OBJECTIVE BOX
discussed with IR yesterday and dr. canas,  plan for embolization can not be done because of poor liver function with Wale 6  nexavar is also not a good choice for poor liver function  opdivo can be tried if she can not tolerate nexavar  prognosis is very poor

## 2018-02-22 NOTE — DISCHARGE NOTE ADULT - MEDICATION SUMMARY - MEDICATIONS TO STOP TAKING
I will STOP taking the medications listed below when I get home from the hospital:    metoprolol succinate 50 mg oral tablet, extended release  -- 1 tab(s) by mouth once a day    amLODIPine 5 mg oral tablet  -- 1 tab(s) by mouth once a day

## 2018-02-22 NOTE — CONSULT NOTE ADULT - PROBLEM SELECTOR RECOMMENDATION 9
ECO. Metastatic disease; Increased size and extent of bilobar hepatic lesions,   Increased size of complex lesions in the upper pole of the left kidney,   previously demonstrating low-level enhancement and suspicious for   neoplasm. ECO. Metastatic disease; Increased size and extent of bilobar hepatic lesions; increased size of complex lesions in the upper pole of the left kidney noted on CT. Per report, patient in denial regarding metastatic disease. Due to pt's agitation, unable to discuss goals of care/advance directives. Patient remains a full code.

## 2018-02-22 NOTE — DISCHARGE NOTE ADULT - HOSPITAL COURSE
60yo female with a PMH of HCC involving 2 lobes of liver diagnosed in 6/2016 s/p IR embolization, COPD (not on home O2), IDDM, Hep C , HTN, Herpes Zoster.  Presented to the ED for worsening lower extremity edema.    Admitted to medicine for Recurrence of HCC w/ Bilateral LE Edema    Bilateral edema of lower extremity edema  Attributed to liver failure due to Hepatitis c and Hepatocellular Carcinoma   Liver function elevated   Abdominal US revealed  Indeterminate hepatic lesions, especially in the right lobe   suspicious for progression of neoplasm  MRI abdomen revealed increased size of complex lesions in the upper pole of the left kidney, previously demonstrating low-level enhancement and suspicious for   neoplasm.  Continued Lasix   Managed pain w/ Tramadol      Elevated Liver Enzymes  Due to liver failure secondary to Hepatitis c and Hepatocellular Carcinoma     Hepatocellular Carcinoma, Recurrence  HematologistOncologist-Dr. Payne consulted  Not a candidate for IR embolization at this time    COPD (Chronic Obstructive Pulmonary Disease)   Stable  Continued Duoneb     HTN (Hypertension)  Continued Metoprolol  Discontinued Amlodipine due to labile blood pressure    Diabetes Mellitus Type 2   Held oral hypoglycemics  Monitored finger sticks and stable  Treated w/ Lantus & Humalog    Need for prophylactic measure  Received Lovenox for DVT prophylaxis 64yo female with a PMH of HCC involving 2 lobes of liver diagnosed in 6/2016 s/p IR embolization, COPD (not on home O2), IDDM, Hep C , HTN, Herpes Zoster.  Presented to the ED for worsening lower extremity edema.    Admitted to medicine for Recurrence of HCC w/ Bilateral LE Edema    Bilateral edema of lower extremity edema  Attributed to liver failure due to Hepatitis c and Hepatocellular Carcinoma   Liver function elevated   Abdominal US revealed  Indeterminate hepatic lesions, especially in the right lobe   suspicious for progression of neoplasm  MRI abdomen revealed increased size of complex lesions in the upper pole of the left kidney, previously demonstrating low-level enhancement and suspicious for   neoplasm.  Continued Lasix   Managed pain w/ Tramadol      Elevated Liver Enzymes  Due to liver failure secondary to Hepatitis c and Hepatocellular Carcinoma     Hepatocellular Carcinoma, Recurrence  HematologistOncologist-Dr. Payne consulted  Not a candidate for IR embolization at this time    COPD (Chronic Obstructive Pulmonary Disease)   Stable  Continued Duoneb     HTN (Hypertension)  Continued Metoprolol  Discontinued Amlodipine due to labile blood pressure    Diabetes Mellitus Type 2   Held oral hypoglycemics  Monitored finger sticks and stable  Treated w/ Lantus & Humalog    Need for prophylactic measure  Received Lovenox for DVT prophylaxis

## 2018-03-01 ENCOUNTER — INPATIENT (INPATIENT)
Facility: HOSPITAL | Age: 63
LOS: 6 days | DRG: 441 | End: 2018-03-08
Attending: INTERNAL MEDICINE | Admitting: INTERNAL MEDICINE
Payer: MEDICARE

## 2018-03-01 VITALS
RESPIRATION RATE: 20 BRPM | OXYGEN SATURATION: 96 % | DIASTOLIC BLOOD PRESSURE: 80 MMHG | HEART RATE: 114 BPM | SYSTOLIC BLOOD PRESSURE: 113 MMHG | WEIGHT: 139.99 LBS | HEIGHT: 62 IN | TEMPERATURE: 98 F

## 2018-03-01 DIAGNOSIS — Z98.89 OTHER SPECIFIED POSTPROCEDURAL STATES: Chronic | ICD-10-CM

## 2018-03-01 DIAGNOSIS — C22.8 MALIGNANT NEOPLASM OF LIVER, PRIMARY, UNSPECIFIED AS TO TYPE: ICD-10-CM

## 2018-03-01 DIAGNOSIS — C22.0 LIVER CELL CARCINOMA: ICD-10-CM

## 2018-03-01 DIAGNOSIS — R60.0 LOCALIZED EDEMA: ICD-10-CM

## 2018-03-01 DIAGNOSIS — J44.9 CHRONIC OBSTRUCTIVE PULMONARY DISEASE, UNSPECIFIED: ICD-10-CM

## 2018-03-01 DIAGNOSIS — E11.9 TYPE 2 DIABETES MELLITUS WITHOUT COMPLICATIONS: ICD-10-CM

## 2018-03-01 DIAGNOSIS — Z65.9 PROBLEM RELATED TO UNSPECIFIED PSYCHOSOCIAL CIRCUMSTANCES: ICD-10-CM

## 2018-03-01 DIAGNOSIS — I10 ESSENTIAL (PRIMARY) HYPERTENSION: ICD-10-CM

## 2018-03-01 DIAGNOSIS — Z71.89 OTHER SPECIFIED COUNSELING: ICD-10-CM

## 2018-03-01 DIAGNOSIS — Z29.9 ENCOUNTER FOR PROPHYLACTIC MEASURES, UNSPECIFIED: ICD-10-CM

## 2018-03-01 LAB
ALBUMIN SERPL ELPH-MCNC: 1.8 G/DL — LOW (ref 3.5–5)
ALP SERPL-CCNC: 334 U/L — HIGH (ref 40–120)
ALT FLD-CCNC: 88 U/L DA — HIGH (ref 10–60)
ANION GAP SERPL CALC-SCNC: 11 MMOL/L — SIGNIFICANT CHANGE UP (ref 5–17)
AST SERPL-CCNC: 676 U/L — HIGH (ref 10–40)
BASOPHILS # BLD AUTO: 0.2 K/UL — SIGNIFICANT CHANGE UP (ref 0–0.2)
BASOPHILS NFR BLD AUTO: 1 % — SIGNIFICANT CHANGE UP (ref 0–2)
BILIRUB SERPL-MCNC: 7.8 MG/DL — HIGH (ref 0.2–1.2)
BUN SERPL-MCNC: 13 MG/DL — SIGNIFICANT CHANGE UP (ref 7–18)
CALCIUM SERPL-MCNC: 8.3 MG/DL — LOW (ref 8.4–10.5)
CHLORIDE SERPL-SCNC: 99 MMOL/L — SIGNIFICANT CHANGE UP (ref 96–108)
CO2 SERPL-SCNC: 24 MMOL/L — SIGNIFICANT CHANGE UP (ref 22–31)
CREAT SERPL-MCNC: 0.77 MG/DL — SIGNIFICANT CHANGE UP (ref 0.5–1.3)
EOSINOPHIL # BLD AUTO: 0 K/UL — SIGNIFICANT CHANGE UP (ref 0–0.5)
EOSINOPHIL NFR BLD AUTO: 0.3 % — SIGNIFICANT CHANGE UP (ref 0–6)
GLUCOSE BLDC GLUCOMTR-MCNC: 118 MG/DL — HIGH (ref 70–99)
GLUCOSE BLDC GLUCOMTR-MCNC: 55 MG/DL — LOW (ref 70–99)
GLUCOSE BLDC GLUCOMTR-MCNC: 86 MG/DL — SIGNIFICANT CHANGE UP (ref 70–99)
GLUCOSE SERPL-MCNC: 51 MG/DL — LOW (ref 70–99)
HCT VFR BLD CALC: 41.7 % — SIGNIFICANT CHANGE UP (ref 34.5–45)
HGB BLD-MCNC: 12.3 G/DL — SIGNIFICANT CHANGE UP (ref 11.5–15.5)
LYMPHOCYTES # BLD AUTO: 14.6 % — SIGNIFICANT CHANGE UP (ref 13–44)
LYMPHOCYTES # BLD AUTO: 2.3 K/UL — SIGNIFICANT CHANGE UP (ref 1–3.3)
MCHC RBC-ENTMCNC: 26.9 PG — LOW (ref 27–34)
MCHC RBC-ENTMCNC: 29.6 GM/DL — LOW (ref 32–36)
MCV RBC AUTO: 91 FL — SIGNIFICANT CHANGE UP (ref 80–100)
MONOCYTES # BLD AUTO: 1.2 K/UL — HIGH (ref 0–0.9)
MONOCYTES NFR BLD AUTO: 7.7 % — SIGNIFICANT CHANGE UP (ref 2–14)
NEUTROPHILS # BLD AUTO: 11.8 K/UL — HIGH (ref 1.8–7.4)
NEUTROPHILS NFR BLD AUTO: 76.3 % — SIGNIFICANT CHANGE UP (ref 43–77)
PLATELET # BLD AUTO: 105 K/UL — LOW (ref 150–400)
POTASSIUM SERPL-MCNC: 3.8 MMOL/L — SIGNIFICANT CHANGE UP (ref 3.5–5.3)
POTASSIUM SERPL-SCNC: 3.8 MMOL/L — SIGNIFICANT CHANGE UP (ref 3.5–5.3)
PROT SERPL-MCNC: 6.4 G/DL — SIGNIFICANT CHANGE UP (ref 6–8.3)
RBC # BLD: 4.58 M/UL — SIGNIFICANT CHANGE UP (ref 3.8–5.2)
RBC # FLD: 21.6 % — HIGH (ref 10.3–14.5)
SODIUM SERPL-SCNC: 134 MMOL/L — LOW (ref 135–145)
WBC # BLD: 15.5 K/UL — HIGH (ref 3.8–10.5)
WBC # FLD AUTO: 15.5 K/UL — HIGH (ref 3.8–10.5)

## 2018-03-01 PROCEDURE — 71045 X-RAY EXAM CHEST 1 VIEW: CPT | Mod: 26

## 2018-03-01 PROCEDURE — 99285 EMERGENCY DEPT VISIT HI MDM: CPT

## 2018-03-01 RX ORDER — INSULIN LISPRO 100/ML
VIAL (ML) SUBCUTANEOUS
Qty: 0 | Refills: 0 | Status: DISCONTINUED | OUTPATIENT
Start: 2018-03-01 | End: 2018-03-07

## 2018-03-01 RX ORDER — METOPROLOL TARTRATE 50 MG
25 TABLET ORAL
Qty: 0 | Refills: 0 | Status: DISCONTINUED | OUTPATIENT
Start: 2018-03-01 | End: 2018-03-05

## 2018-03-01 RX ORDER — DEXTROSE 50 % IN WATER 50 %
1 SYRINGE (ML) INTRAVENOUS ONCE
Qty: 0 | Refills: 0 | Status: DISCONTINUED | OUTPATIENT
Start: 2018-03-01 | End: 2018-03-08

## 2018-03-01 RX ORDER — GLUCAGON INJECTION, SOLUTION 0.5 MG/.1ML
1 INJECTION, SOLUTION SUBCUTANEOUS ONCE
Qty: 0 | Refills: 0 | Status: DISCONTINUED | OUTPATIENT
Start: 2018-03-01 | End: 2018-03-07

## 2018-03-01 RX ORDER — ENOXAPARIN SODIUM 100 MG/ML
40 INJECTION SUBCUTANEOUS DAILY
Qty: 0 | Refills: 0 | Status: DISCONTINUED | OUTPATIENT
Start: 2018-03-01 | End: 2018-03-07

## 2018-03-01 RX ORDER — FUROSEMIDE 40 MG
40 TABLET ORAL
Qty: 0 | Refills: 0 | Status: DISCONTINUED | OUTPATIENT
Start: 2018-03-01 | End: 2018-03-08

## 2018-03-01 RX ORDER — THIAMINE MONONITRATE (VIT B1) 100 MG
100 TABLET ORAL DAILY
Qty: 0 | Refills: 0 | Status: DISCONTINUED | OUTPATIENT
Start: 2018-03-01 | End: 2018-03-06

## 2018-03-01 RX ORDER — TRAMADOL HYDROCHLORIDE 50 MG/1
50 TABLET ORAL EVERY 4 HOURS
Qty: 0 | Refills: 0 | Status: DISCONTINUED | OUTPATIENT
Start: 2018-03-01 | End: 2018-03-02

## 2018-03-01 RX ORDER — SODIUM CHLORIDE 9 MG/ML
1000 INJECTION, SOLUTION INTRAVENOUS
Qty: 0 | Refills: 0 | Status: DISCONTINUED | OUTPATIENT
Start: 2018-03-01 | End: 2018-03-07

## 2018-03-01 RX ORDER — DEXTROSE 50 % IN WATER 50 %
25 SYRINGE (ML) INTRAVENOUS ONCE
Qty: 0 | Refills: 0 | Status: DISCONTINUED | OUTPATIENT
Start: 2018-03-01 | End: 2018-03-07

## 2018-03-01 RX ORDER — DEXTROSE 50 % IN WATER 50 %
50 SYRINGE (ML) INTRAVENOUS ONCE
Qty: 0 | Refills: 0 | Status: COMPLETED | OUTPATIENT
Start: 2018-03-01 | End: 2018-03-01

## 2018-03-01 RX ORDER — DEXTROSE 50 % IN WATER 50 %
12.5 SYRINGE (ML) INTRAVENOUS ONCE
Qty: 0 | Refills: 0 | Status: DISCONTINUED | OUTPATIENT
Start: 2018-03-01 | End: 2018-03-07

## 2018-03-01 RX ORDER — MORPHINE SULFATE 50 MG/1
4 CAPSULE, EXTENDED RELEASE ORAL ONCE
Qty: 0 | Refills: 0 | Status: DISCONTINUED | OUTPATIENT
Start: 2018-03-01 | End: 2018-03-01

## 2018-03-01 RX ORDER — TIOTROPIUM BROMIDE 18 UG/1
1 CAPSULE ORAL; RESPIRATORY (INHALATION) DAILY
Qty: 0 | Refills: 0 | Status: DISCONTINUED | OUTPATIENT
Start: 2018-03-01 | End: 2018-03-08

## 2018-03-01 RX ORDER — INSULIN GLARGINE 100 [IU]/ML
30 INJECTION, SOLUTION SUBCUTANEOUS AT BEDTIME
Qty: 0 | Refills: 0 | Status: DISCONTINUED | OUTPATIENT
Start: 2018-03-01 | End: 2018-03-02

## 2018-03-01 RX ADMIN — MORPHINE SULFATE 4 MILLIGRAM(S): 50 CAPSULE, EXTENDED RELEASE ORAL at 16:58

## 2018-03-01 RX ADMIN — TRAMADOL HYDROCHLORIDE 50 MILLIGRAM(S): 50 TABLET ORAL at 20:41

## 2018-03-01 RX ADMIN — Medication 50 MILLILITER(S): at 16:58

## 2018-03-01 RX ADMIN — MORPHINE SULFATE 4 MILLIGRAM(S): 50 CAPSULE, EXTENDED RELEASE ORAL at 16:00

## 2018-03-01 RX ADMIN — TRAMADOL HYDROCHLORIDE 50 MILLIGRAM(S): 50 TABLET ORAL at 21:11

## 2018-03-01 NOTE — H&P ADULT - PROBLEM SELECTOR PLAN 6
IMPROVE VTE Individual Risk Assessment          RISK                                                          Points  [  ] Previous VTE                                                3  [  ] Thrombophilia                                             2  [  ] Lower limb paralysis                                   2        (unable to hold up >15 seconds)    [x  ] Current Cancer                                             2         (within 6 months)  [ x ] Immobilization > 24 hrs                              1  [  ] ICU/CCU stay > 24 hours                             1  [ x ] Age > 60                                                         1    IMPROVE VTE Score:  4  will start on lovenox subcutaneous

## 2018-03-01 NOTE — H&P ADULT - ASSESSMENT
64 yo F from home , with a hx of HCC involving 2 lobes of liver diagnosed in 6/2016 s/p IR embolization, COPD (not on home O2), DM, Hep C , HTN, Herpes Zoster sent to ED from home by visiting home services when they found her on bed condition at home. Complaints of abdominal pain which is chronic since diagnosis of HCC.

## 2018-03-01 NOTE — H&P ADULT - PROBLEM SELECTOR PLAN 3
home medication lantus 30 HS and humalog 7 TID   low sugar noted in ED   likely from not eating in ED  will restart on Lantus 15 units at bedtime and accucheck with sliding scale   recent HBA1C level of 5.8 on previous admission in feb 2018

## 2018-03-01 NOTE — H&P ADULT - NEUROLOGICAL DETAILS
sensation intact/deep reflexes intact/cranial nerves intact/responds to pain/responds to verbal commands/alert and oriented x 3

## 2018-03-01 NOTE — H&P ADULT - PROBLEM SELECTOR PLAN 8
patient was full code on previous admission last week  had detailed discussion with her and does not want any artificial life sustaining support  DNR/DNI/ no feeding tube  primary team to consult palliative

## 2018-03-01 NOTE — ED PROVIDER NOTE - MEDICAL DECISION MAKING DETAILS
62 y/o F pt presents with generalized body pain. Plan for admission for safety reasons, as well as to reassess for hospice situation. Will give Morphine for pain relief as well.

## 2018-03-01 NOTE — ED PROVIDER NOTE - PMH
Bilateral edema of lower extremity    COPD (chronic obstructive pulmonary disease)    Diabetes mellitus type 2, insulin dependent    Hepatitis C    Hepatocellular carcinoma    Herpes zoster without complication    Liver cancer, primary, with metastasis from liver to other site    Other specified diabetes mellitus with unspecified complications    Secondary hypertension, hypertension with unspecified goal    Varicose veins

## 2018-03-01 NOTE — ED PROVIDER NOTE - OBJECTIVE STATEMENT
64 y/o F pt with b/l Lower Extremity Edema, COPD, DM (Type II), Hepatitis C, Hepatocellular Carcinoma (metastatic), Herpes Zoster, HTN, and Varicose Veins and PSHx of Eye Surgery, Lumpectomy, and Liver Biopsy BIB EMS to ED with generalized body pain x2 weeks. Per  from [facility], pt was diagnosed with metastatic liver CA x2 weeks ago; pt was offered palliative hospice care at the time, but pt declined. Pt's sister called Adult Protective Services out of concern for the pt as pt's living conditions and ability to care for herself were questioned; pt was brought from her house to ED for evaluation. In ED, pt denies any other complaints. Allergies: Penicillin (anaphylaxis). : Sophie (phone number: [phone]).

## 2018-03-01 NOTE — ED PROVIDER NOTE - CARE PLAN
Principal Discharge DX:	Liver cancer, primary, with metastasis from liver to other site  Secondary Diagnosis:	Bilateral edema of lower extremity

## 2018-03-01 NOTE — H&P ADULT - NSHPLABSRESULTS_GEN_ALL_CORE
12.3   15.5  )-----------( 105      ( 01 Mar 2018 16:09 )             41.7       03-01    134<L>  |  99  |  13  ----------------------------<  51<L>  3.8   |  24  |  0.77    Ca    8.3<L>      01 Mar 2018 16:09    TPro  6.4  /  Alb  1.8<L>  /  TBili  7.8<H>  /  DBili  x   /  AST  676<H>  /  ALT  88<H>  /  AlkPhos  334<H>  03-01    CAPILLARY BLOOD GLUCOSE      POCT Blood Glucose.: 86 mg/dL (01 Mar 2018 21:05)    Vital Signs Last 24 Hrs  T(C): 36.6 (01 Mar 2018 20:15), Max: 36.6 (01 Mar 2018 15:32)  T(F): 97.8 (01 Mar 2018 20:15), Max: 97.9 (01 Mar 2018 15:32)  HR: 100 (01 Mar 2018 20:15) (100 - 114)  BP: 126/69 (01 Mar 2018 20:15) (103/64 - 126/69)  RR: 18 (01 Mar 2018 20:15) (18 - 20)  SpO2: 94% (01 Mar 2018 20:15) (94% - 96%)

## 2018-03-01 NOTE — H&P ADULT - HISTORY OF PRESENT ILLNESS
64 yo F from home , with a hx of HCC involving 2 lobes of liver diagnosed in 6/2016 s/p IR embolization, COPD (not on home O2), DM, Hep C , HTN, Herpes Zoster presents to the E.D for worsening lower extremity edema . patient very poor historian and very upset staying IN ED does not want to give any history . patient denies any shortness of breath and able to lie down flat and comfortable appearing . patient complaining of severe pain in legs . patient denies any chest pain , shortness of breath , nausea , vomiting , abdominal pain but denies any blood in stool or any other urinary complaints.    IN the E.D patient vitals are temp-98 , hr-130 and rr-22 and spo2-96 and cxr -no evidence of pulmonary congestion 62 yo F from home , with a hx of HCC involving 2 lobes of liver diagnosed in 6/2016 s/p IR embolization, COPD (not on home O2), DM, Hep C , HTN, Herpes Zoster sent to ED from home by visiting home services when they found her on bed condition at home. Complaints of abdominal pain which is chronic since diagnosis of HCC.  Denies nausea, vomiting, diarrhea, fever, chills, cough.

## 2018-03-01 NOTE — H&P ADULT - PROBLEM SELECTOR PLAN 1
hepatocellular carcinoma with B/ L lung metastasis  S/p IR embolization on previous admission last week   paracentesis done on previous admission   distended abdomen noted - no SOB at this time   primary team to therapeutic paracentesis

## 2018-03-02 DIAGNOSIS — C22.0 LIVER CELL CARCINOMA: ICD-10-CM

## 2018-03-02 DIAGNOSIS — R10.9 UNSPECIFIED ABDOMINAL PAIN: ICD-10-CM

## 2018-03-02 LAB
24R-OH-CALCIDIOL SERPL-MCNC: 10 NG/ML — LOW (ref 30–80)
ALBUMIN SERPL ELPH-MCNC: 1.8 G/DL — LOW (ref 3.5–5)
ALP SERPL-CCNC: 295 U/L — HIGH (ref 40–120)
ALT FLD-CCNC: 86 U/L DA — HIGH (ref 10–60)
ANION GAP SERPL CALC-SCNC: 12 MMOL/L — SIGNIFICANT CHANGE UP (ref 5–17)
APTT BLD: 34.1 SEC — SIGNIFICANT CHANGE UP (ref 27.5–37.4)
AST SERPL-CCNC: 607 U/L — HIGH (ref 10–40)
BILIRUB SERPL-MCNC: 7.5 MG/DL — HIGH (ref 0.2–1.2)
BUN SERPL-MCNC: 16 MG/DL — SIGNIFICANT CHANGE UP (ref 7–18)
CALCIUM SERPL-MCNC: 8.7 MG/DL — SIGNIFICANT CHANGE UP (ref 8.4–10.5)
CHLORIDE SERPL-SCNC: 98 MMOL/L — SIGNIFICANT CHANGE UP (ref 96–108)
CO2 SERPL-SCNC: 22 MMOL/L — SIGNIFICANT CHANGE UP (ref 22–31)
CREAT SERPL-MCNC: 0.9 MG/DL — SIGNIFICANT CHANGE UP (ref 0.5–1.3)
FOLATE SERPL-MCNC: 7.8 NG/ML — SIGNIFICANT CHANGE UP (ref 4.8–24.2)
GLUCOSE BLDC GLUCOMTR-MCNC: 100 MG/DL — HIGH (ref 70–99)
GLUCOSE BLDC GLUCOMTR-MCNC: 104 MG/DL — HIGH (ref 70–99)
GLUCOSE BLDC GLUCOMTR-MCNC: 115 MG/DL — HIGH (ref 70–99)
GLUCOSE BLDC GLUCOMTR-MCNC: 119 MG/DL — HIGH (ref 70–99)
GLUCOSE BLDC GLUCOMTR-MCNC: 70 MG/DL — SIGNIFICANT CHANGE UP (ref 70–99)
GLUCOSE BLDC GLUCOMTR-MCNC: 90 MG/DL — SIGNIFICANT CHANGE UP (ref 70–99)
GLUCOSE SERPL-MCNC: 109 MG/DL — HIGH (ref 70–99)
HCT VFR BLD CALC: 38.6 % — SIGNIFICANT CHANGE UP (ref 34.5–45)
HGB BLD-MCNC: 12.1 G/DL — SIGNIFICANT CHANGE UP (ref 11.5–15.5)
INR BLD: 1.96 RATIO — HIGH (ref 0.88–1.16)
MCHC RBC-ENTMCNC: 29 PG — SIGNIFICANT CHANGE UP (ref 27–34)
MCHC RBC-ENTMCNC: 31.3 GM/DL — LOW (ref 32–36)
MCV RBC AUTO: 92.5 FL — SIGNIFICANT CHANGE UP (ref 80–100)
PLATELET # BLD AUTO: 96 K/UL — LOW (ref 150–400)
POTASSIUM SERPL-MCNC: 4 MMOL/L — SIGNIFICANT CHANGE UP (ref 3.5–5.3)
POTASSIUM SERPL-SCNC: 4 MMOL/L — SIGNIFICANT CHANGE UP (ref 3.5–5.3)
PROT SERPL-MCNC: 6.3 G/DL — SIGNIFICANT CHANGE UP (ref 6–8.3)
PROTHROM AB SERPL-ACNC: 21.7 SEC — HIGH (ref 9.8–12.7)
RBC # BLD: 4.17 M/UL — SIGNIFICANT CHANGE UP (ref 3.8–5.2)
RBC # FLD: 21.8 % — HIGH (ref 10.3–14.5)
SODIUM SERPL-SCNC: 132 MMOL/L — LOW (ref 135–145)
WBC # BLD: 14.7 K/UL — HIGH (ref 3.8–10.5)
WBC # FLD AUTO: 14.7 K/UL — HIGH (ref 3.8–10.5)

## 2018-03-02 PROCEDURE — 99223 1ST HOSP IP/OBS HIGH 75: CPT

## 2018-03-02 RX ORDER — MORPHINE SULFATE 50 MG/1
2 CAPSULE, EXTENDED RELEASE ORAL EVERY 6 HOURS
Qty: 0 | Refills: 0 | Status: DISCONTINUED | OUTPATIENT
Start: 2018-03-02 | End: 2018-03-02

## 2018-03-02 RX ORDER — GABAPENTIN 400 MG/1
100 CAPSULE ORAL AT BEDTIME
Qty: 0 | Refills: 0 | Status: DISCONTINUED | OUTPATIENT
Start: 2018-03-02 | End: 2018-03-03

## 2018-03-02 RX ORDER — KETOROLAC TROMETHAMINE 30 MG/ML
30 SYRINGE (ML) INJECTION ONCE
Qty: 0 | Refills: 0 | Status: DISCONTINUED | OUTPATIENT
Start: 2018-03-02 | End: 2018-03-02

## 2018-03-02 RX ORDER — INSULIN GLARGINE 100 [IU]/ML
10 INJECTION, SOLUTION SUBCUTANEOUS AT BEDTIME
Qty: 0 | Refills: 0 | Status: DISCONTINUED | OUTPATIENT
Start: 2018-03-02 | End: 2018-03-05

## 2018-03-02 RX ORDER — SENNA PLUS 8.6 MG/1
2 TABLET ORAL AT BEDTIME
Qty: 0 | Refills: 0 | Status: DISCONTINUED | OUTPATIENT
Start: 2018-03-02 | End: 2018-03-08

## 2018-03-02 RX ORDER — MORPHINE SULFATE 50 MG/1
2 CAPSULE, EXTENDED RELEASE ORAL EVERY 4 HOURS
Qty: 0 | Refills: 0 | Status: DISCONTINUED | OUTPATIENT
Start: 2018-03-02 | End: 2018-03-02

## 2018-03-02 RX ORDER — ERGOCALCIFEROL 1.25 MG/1
50000 CAPSULE ORAL
Qty: 0 | Refills: 0 | Status: DISCONTINUED | OUTPATIENT
Start: 2018-03-02 | End: 2018-03-06

## 2018-03-02 RX ORDER — FENTANYL CITRATE 50 UG/ML
1 INJECTION INTRAVENOUS
Qty: 0 | Refills: 0 | Status: DISCONTINUED | OUTPATIENT
Start: 2018-03-02 | End: 2018-03-07

## 2018-03-02 RX ORDER — SODIUM CHLORIDE 9 MG/ML
1000 INJECTION, SOLUTION INTRAVENOUS
Qty: 0 | Refills: 0 | Status: DISCONTINUED | OUTPATIENT
Start: 2018-03-02 | End: 2018-03-02

## 2018-03-02 RX ORDER — HYDROMORPHONE HYDROCHLORIDE 2 MG/ML
0.5 INJECTION INTRAMUSCULAR; INTRAVENOUS; SUBCUTANEOUS EVERY 4 HOURS
Qty: 0 | Refills: 0 | Status: DISCONTINUED | OUTPATIENT
Start: 2018-03-02 | End: 2018-03-06

## 2018-03-02 RX ADMIN — TRAMADOL HYDROCHLORIDE 50 MILLIGRAM(S): 50 TABLET ORAL at 03:25

## 2018-03-02 RX ADMIN — MORPHINE SULFATE 2 MILLIGRAM(S): 50 CAPSULE, EXTENDED RELEASE ORAL at 00:56

## 2018-03-02 RX ADMIN — Medication 1 DROP(S): at 21:52

## 2018-03-02 RX ADMIN — Medication 1 DROP(S): at 14:05

## 2018-03-02 RX ADMIN — HYDROMORPHONE HYDROCHLORIDE 0.5 MILLIGRAM(S): 2 INJECTION INTRAMUSCULAR; INTRAVENOUS; SUBCUTANEOUS at 21:57

## 2018-03-02 RX ADMIN — FENTANYL CITRATE 1 PATCH: 50 INJECTION INTRAVENOUS at 21:52

## 2018-03-02 RX ADMIN — MORPHINE SULFATE 2 MILLIGRAM(S): 50 CAPSULE, EXTENDED RELEASE ORAL at 01:26

## 2018-03-02 RX ADMIN — TRAMADOL HYDROCHLORIDE 50 MILLIGRAM(S): 50 TABLET ORAL at 02:55

## 2018-03-02 RX ADMIN — HYDROMORPHONE HYDROCHLORIDE 0.5 MILLIGRAM(S): 2 INJECTION INTRAMUSCULAR; INTRAVENOUS; SUBCUTANEOUS at 22:30

## 2018-03-02 RX ADMIN — Medication 100 MILLIGRAM(S): at 11:22

## 2018-03-02 RX ADMIN — ENOXAPARIN SODIUM 40 MILLIGRAM(S): 100 INJECTION SUBCUTANEOUS at 11:22

## 2018-03-02 RX ADMIN — INSULIN GLARGINE 10 UNIT(S): 100 INJECTION, SOLUTION SUBCUTANEOUS at 21:52

## 2018-03-02 RX ADMIN — Medication 25 MILLIGRAM(S): at 17:20

## 2018-03-02 RX ADMIN — ERGOCALCIFEROL 50000 UNIT(S): 1.25 CAPSULE ORAL at 14:05

## 2018-03-02 RX ADMIN — GABAPENTIN 100 MILLIGRAM(S): 400 CAPSULE ORAL at 21:51

## 2018-03-02 RX ADMIN — Medication 40 MILLIGRAM(S): at 17:21

## 2018-03-02 RX ADMIN — MORPHINE SULFATE 2 MILLIGRAM(S): 50 CAPSULE, EXTENDED RELEASE ORAL at 14:35

## 2018-03-02 RX ADMIN — Medication 1 TABLET(S): at 11:21

## 2018-03-02 RX ADMIN — MORPHINE SULFATE 2 MILLIGRAM(S): 50 CAPSULE, EXTENDED RELEASE ORAL at 08:39

## 2018-03-02 RX ADMIN — MORPHINE SULFATE 2 MILLIGRAM(S): 50 CAPSULE, EXTENDED RELEASE ORAL at 14:05

## 2018-03-02 RX ADMIN — MORPHINE SULFATE 2 MILLIGRAM(S): 50 CAPSULE, EXTENDED RELEASE ORAL at 08:09

## 2018-03-02 RX ADMIN — SENNA PLUS 2 TABLET(S): 8.6 TABLET ORAL at 21:51

## 2018-03-02 NOTE — CONSULT NOTE ADULT - SUBJECTIVE AND OBJECTIVE BOX
HPI:  64 yo F from home , with a hx of HCC involving 2 lobes of liver diagnosed in 6/2016 s/p IR embolization, COPD (not on home O2), DM, Hep C , HTN, Herpes Zoster sent to ED from home by visiting home services when they found her on bed condition at home. Complaints of abdominal pain which is chronic since diagnosis of HCC.  Denies nausea, vomiting, diarrhea, fever, chills, cough. (01 Mar 2018 21:18)      3/2/18 - 63 year old female, with Hepatic carcinoma is admitted with ruq pain.  Pt states that it is chronic but has become severe in the last several months.  Pt is s/p IR embolization in 2016.  Recent ct shows possible mets to liver.  No nausea or vomiting.  + bm two days ago.      PAIN SCORE:         SCALE USED: (1-10 VNRS)      PAST MEDICAL & SURGICAL HISTORY:  Liver cancer, primary, with metastasis from liver to other site  Bilateral edema of lower extremity  Herpes zoster without complication  Diabetes mellitus type 2, insulin dependent  Hepatocellular carcinoma  Hepatitis C  Varicose veins  Secondary hypertension, hypertension with unspecified goal  Other specified diabetes mellitus with unspecified complications  COPD (chronic obstructive pulmonary disease)  History of liver biopsy  H/O eye surgery  H/O lumpectomy      FAMILY HISTORY:  Family history of coronary artery disease (Father)  Family history of diabetes mellitus (Mother)      SOCIAL HISTORY:  [ ] Denies Smoking, Alcohol, or Drug Use    Allergies    penicillin (Anaphylaxis)    Intolerances        PAIN MEDICATIONS:  morphine  - Injectable 2 milliGRAM(s) IV Push every 4 hours PRN  traMADol 50 milliGRAM(s) Oral every 4 hours PRN    Heme:  enoxaparin Injectable 40 milliGRAM(s) SubCutaneous daily    Antibiotics:    Cardiovascular:  furosemide    Tablet 40 milliGRAM(s) Oral two times a day  metoprolol     tartrate 25 milliGRAM(s) Oral two times a day    GI:    Endocrine:  dextrose 50% Injectable 12.5 Gram(s) IV Push once  dextrose 50% Injectable 25 Gram(s) IV Push once  dextrose 50% Injectable 25 Gram(s) IV Push once  dextrose Gel 1 Dose(s) Oral once PRN  glucagon  Injectable 1 milliGRAM(s) IntraMuscular once PRN  insulin glargine Injectable (LANTUS) 30 Unit(s) SubCutaneous at bedtime  insulin lispro (HumaLOG) corrective regimen sliding scale   SubCutaneous three times a day before meals    All Other Medications:  artificial  tears Solution 1 Drop(s) Both EYES three times a day  dextrose 5%. 1000 milliLiter(s) IV Continuous <Continuous>  ergocalciferol 48463 Unit(s) Oral <User Schedule>  multivitamin 1 Tablet(s) Oral daily  thiamine 100 milliGRAM(s) Oral daily          Vital Signs Last 24 Hrs  T(C): 37.3 (02 Mar 2018 14:12), Max: 37.3 (02 Mar 2018 14:12)  T(F): 99.2 (02 Mar 2018 14:12), Max: 99.2 (02 Mar 2018 14:12)  HR: 111 (02 Mar 2018 14:12) (92 - 111)  BP: 145/79 (02 Mar 2018 14:12) (103/64 - 145/79)  BP(mean): --  RR: 18 (02 Mar 2018 14:12) (17 - 18)  SpO2: 95% (02 Mar 2018 14:12) (94% - 96%)                       LABS:                          12.1   14.7  )-----------( 96       ( 02 Mar 2018 05:43 )             38.6     03-02    132<L>  |  98  |  16  ----------------------------<  109<H>  4.0   |  22  |  0.90    Ca    8.7      02 Mar 2018 05:43    TPro  6.3  /  Alb  1.8<L>  /  TBili  7.5<H>  /  DBili  x   /  AST  607<H>  /  ALT  86<H>  /  AlkPhos  295<H>  03-02    PT/INR - ( 02 Mar 2018 12:51 )   PT: 21.7 sec;   INR: 1.96 ratio         PTT - ( 02 Mar 2018 12:51 )  PTT:34.1 sec      RADIOLOGY:    Drug Screen:            [ ]  [place]  Reviewed and Copied to Chart

## 2018-03-02 NOTE — PROGRESS NOTE ADULT - SUBJECTIVE AND OBJECTIVE BOX
INTERVAL HPI/OVERNIGHT EVENTS:  pt has abdominal pain , pt has mild resp distress , abd distention.    VITAL SIGNS:  T(F): 99.2 (03-02-18 @ 14:12)  HR: 96 (03-02-18 @ 17:19)  BP: 140/89 (03-02-18 @ 17:19)  RR: 18 (03-02-18 @ 14:12)  SpO2: 95% (03-02-18 @ 14:12)      PHYSICAL EXAM:  general -  obese, in mild- mod distress due to pain and breathing  Eyes -PERRL; EOMI, + ICTERUS  Neck  supple; no JVD; normal thyroid gland  Respiratory - airway patent; breath sounds equal; good air movement  Cardiovascular -	regular rate and rhythm  no rub  no murmur  normal PMI   ·GI - tender  distended  ascites  bowel sounds hypoactive ; Tenderness -diffuse   -Extremities Details - no clubbing; no cyanosis; +pedal edema  · Neurological Details - alert and oriented x 3; responds to pain; responds to verbal commands; sensation intact; deep reflexes intact; cranial nerves intact  · Skin Details - warm and dry; jaundiced    MEDICATIONS  (STANDING):  artificial  tears Solution 1 Drop(s) Both EYES three times a day  dextrose 5%. 1000 milliLiter(s) (50 mL/Hr) IV Continuous <Continuous>  dextrose 50% Injectable 12.5 Gram(s) IV Push once  dextrose 50% Injectable 25 Gram(s) IV Push once  dextrose 50% Injectable 25 Gram(s) IV Push once  enoxaparin Injectable 40 milliGRAM(s) SubCutaneous daily  ergocalciferol 88372 Unit(s) Oral <User Schedule>  fentaNYL   Patch  12 MICROgram(s)/Hr 1 Patch Transdermal every 72 hours  furosemide    Tablet 40 milliGRAM(s) Oral two times a day  gabapentin 100 milliGRAM(s) Oral at bedtime  insulin glargine Injectable (LANTUS) 30 Unit(s) SubCutaneous at bedtime  insulin lispro (HumaLOG) corrective regimen sliding scale   SubCutaneous three times a day before meals  metoprolol     tartrate 25 milliGRAM(s) Oral two times a day  multivitamin 1 Tablet(s) Oral daily  senna 2 Tablet(s) Oral at bedtime  thiamine 100 milliGRAM(s) Oral daily  tiotropium 18 MICROgram(s) Capsule 1 Capsule(s) Inhalation daily    MEDICATIONS  (PRN):  dextrose Gel 1 Dose(s) Oral once PRN Blood Glucose LESS THAN 70 milliGRAM(s)/deciliter  glucagon  Injectable 1 milliGRAM(s) IntraMuscular once PRN Glucose LESS THAN 70 milligrams/deciliter  HYDROmorphone  Injectable 0.5 milliGRAM(s) IV Push every 4 hours PRN Severe Pain (7 - 10)      Allergies    penicillin (Anaphylaxis)    Intolerances        LABS:                        12.1   14.7  )-----------( 96       ( 02 Mar 2018 05:43 )             38.6     03-02    132<L>  |  98  |  16  ----------------------------<  109<H>  4.0   |  22  |  0.90    Ca    8.7      02 Mar 2018 05:43    TPro  6.3  /  Alb  1.8<L>  /  TBili  7.5<H>  /  DBili  x   /  AST  607<H>  /  ALT  86<H>  /  AlkPhos  295<H>  03-02    PT/INR - ( 02 Mar 2018 12:51 )   PT: 21.7 sec;   INR: 1.96 ratio         PTT - ( 02 Mar 2018 12:51 )  PTT:34.1 sec      RADIOLOGY & ADDITIONAL TESTS:

## 2018-03-02 NOTE — CONSULT NOTE ADULT - PROBLEM SELECTOR RECOMMENDATION 9
- start fentanyl patch 12mcg/hr td q 72 hours  - change to hydromorphone 0.5mg ivp q 4 hours prn  - no nsaids due to thrombocytopenia  - avoid products with acetaminophen due to elevated lfts   - turn and position, oob   gabapentin 100mg po qhs - will titrate up

## 2018-03-03 LAB
ALBUMIN SERPL ELPH-MCNC: 1.7 G/DL — LOW (ref 3.5–5)
ALP SERPL-CCNC: 268 U/L — HIGH (ref 40–120)
ALT FLD-CCNC: 92 U/L DA — HIGH (ref 10–60)
ANION GAP SERPL CALC-SCNC: 16 MMOL/L — SIGNIFICANT CHANGE UP (ref 5–17)
AST SERPL-CCNC: 606 U/L — HIGH (ref 10–40)
BASOPHILS # BLD AUTO: 0.2 K/UL — SIGNIFICANT CHANGE UP (ref 0–0.2)
BASOPHILS NFR BLD AUTO: 0.9 % — SIGNIFICANT CHANGE UP (ref 0–2)
BILIRUB SERPL-MCNC: 9.2 MG/DL — HIGH (ref 0.2–1.2)
BUN SERPL-MCNC: 22 MG/DL — HIGH (ref 7–18)
CALCIUM SERPL-MCNC: 9.1 MG/DL — SIGNIFICANT CHANGE UP (ref 8.4–10.5)
CHLORIDE SERPL-SCNC: 98 MMOL/L — SIGNIFICANT CHANGE UP (ref 96–108)
CO2 SERPL-SCNC: 22 MMOL/L — SIGNIFICANT CHANGE UP (ref 22–31)
CREAT SERPL-MCNC: 1 MG/DL — SIGNIFICANT CHANGE UP (ref 0.5–1.3)
EOSINOPHIL # BLD AUTO: 0 K/UL — SIGNIFICANT CHANGE UP (ref 0–0.5)
EOSINOPHIL NFR BLD AUTO: 0.1 % — SIGNIFICANT CHANGE UP (ref 0–6)
GLUCOSE BLDC GLUCOMTR-MCNC: 101 MG/DL — HIGH (ref 70–99)
GLUCOSE BLDC GLUCOMTR-MCNC: 115 MG/DL — HIGH (ref 70–99)
GLUCOSE BLDC GLUCOMTR-MCNC: 130 MG/DL — HIGH (ref 70–99)
GLUCOSE BLDC GLUCOMTR-MCNC: 82 MG/DL — SIGNIFICANT CHANGE UP (ref 70–99)
GLUCOSE SERPL-MCNC: 75 MG/DL — SIGNIFICANT CHANGE UP (ref 70–99)
HCT VFR BLD CALC: 42.8 % — SIGNIFICANT CHANGE UP (ref 34.5–45)
HGB BLD-MCNC: 13.2 G/DL — SIGNIFICANT CHANGE UP (ref 11.5–15.5)
LYMPHOCYTES # BLD AUTO: 15.4 % — SIGNIFICANT CHANGE UP (ref 13–44)
LYMPHOCYTES # BLD AUTO: 2.7 K/UL — SIGNIFICANT CHANGE UP (ref 1–3.3)
MCHC RBC-ENTMCNC: 28.8 PG — SIGNIFICANT CHANGE UP (ref 27–34)
MCHC RBC-ENTMCNC: 30.9 GM/DL — LOW (ref 32–36)
MCV RBC AUTO: 93.1 FL — SIGNIFICANT CHANGE UP (ref 80–100)
MONOCYTES # BLD AUTO: 1.4 K/UL — HIGH (ref 0–0.9)
MONOCYTES NFR BLD AUTO: 7.8 % — SIGNIFICANT CHANGE UP (ref 2–14)
NEUTROPHILS # BLD AUTO: 13.2 K/UL — HIGH (ref 1.8–7.4)
NEUTROPHILS NFR BLD AUTO: 75.8 % — SIGNIFICANT CHANGE UP (ref 43–77)
PLATELET # BLD AUTO: 99 K/UL — LOW (ref 150–400)
POTASSIUM SERPL-MCNC: 4.5 MMOL/L — SIGNIFICANT CHANGE UP (ref 3.5–5.3)
POTASSIUM SERPL-SCNC: 4.5 MMOL/L — SIGNIFICANT CHANGE UP (ref 3.5–5.3)
PROT SERPL-MCNC: 6.6 G/DL — SIGNIFICANT CHANGE UP (ref 6–8.3)
RBC # BLD: 4.6 M/UL — SIGNIFICANT CHANGE UP (ref 3.8–5.2)
RBC # FLD: 22.2 % — HIGH (ref 10.3–14.5)
SODIUM SERPL-SCNC: 136 MMOL/L — SIGNIFICANT CHANGE UP (ref 135–145)
WBC # BLD: 17.3 K/UL — HIGH (ref 3.8–10.5)
WBC # FLD AUTO: 17.3 K/UL — HIGH (ref 3.8–10.5)

## 2018-03-03 PROCEDURE — 99233 SBSQ HOSP IP/OBS HIGH 50: CPT

## 2018-03-03 RX ORDER — GABAPENTIN 400 MG/1
200 CAPSULE ORAL AT BEDTIME
Qty: 0 | Refills: 0 | Status: DISCONTINUED | OUTPATIENT
Start: 2018-03-03 | End: 2018-03-06

## 2018-03-03 RX ORDER — POLYETHYLENE GLYCOL 3350 17 G/17G
17 POWDER, FOR SOLUTION ORAL DAILY
Qty: 0 | Refills: 0 | Status: DISCONTINUED | OUTPATIENT
Start: 2018-03-03 | End: 2018-03-06

## 2018-03-03 RX ADMIN — HYDROMORPHONE HYDROCHLORIDE 0.5 MILLIGRAM(S): 2 INJECTION INTRAMUSCULAR; INTRAVENOUS; SUBCUTANEOUS at 07:39

## 2018-03-03 RX ADMIN — GABAPENTIN 200 MILLIGRAM(S): 400 CAPSULE ORAL at 21:42

## 2018-03-03 RX ADMIN — TIOTROPIUM BROMIDE 1 CAPSULE(S): 18 CAPSULE ORAL; RESPIRATORY (INHALATION) at 12:38

## 2018-03-03 RX ADMIN — INSULIN GLARGINE 10 UNIT(S): 100 INJECTION, SOLUTION SUBCUTANEOUS at 21:42

## 2018-03-03 RX ADMIN — Medication 25 MILLIGRAM(S): at 18:24

## 2018-03-03 RX ADMIN — Medication 1 DROP(S): at 15:03

## 2018-03-03 RX ADMIN — HYDROMORPHONE HYDROCHLORIDE 0.5 MILLIGRAM(S): 2 INJECTION INTRAMUSCULAR; INTRAVENOUS; SUBCUTANEOUS at 10:26

## 2018-03-03 RX ADMIN — POLYETHYLENE GLYCOL 3350 17 GRAM(S): 17 POWDER, FOR SOLUTION ORAL at 12:38

## 2018-03-03 RX ADMIN — Medication 40 MILLIGRAM(S): at 18:28

## 2018-03-03 RX ADMIN — Medication 1 DROP(S): at 06:05

## 2018-03-03 RX ADMIN — HYDROMORPHONE HYDROCHLORIDE 0.5 MILLIGRAM(S): 2 INJECTION INTRAMUSCULAR; INTRAVENOUS; SUBCUTANEOUS at 11:37

## 2018-03-03 RX ADMIN — Medication 25 MILLIGRAM(S): at 06:05

## 2018-03-03 RX ADMIN — HYDROMORPHONE HYDROCHLORIDE 0.5 MILLIGRAM(S): 2 INJECTION INTRAMUSCULAR; INTRAVENOUS; SUBCUTANEOUS at 06:05

## 2018-03-03 RX ADMIN — Medication 1 DROP(S): at 21:43

## 2018-03-03 RX ADMIN — SENNA PLUS 2 TABLET(S): 8.6 TABLET ORAL at 21:42

## 2018-03-03 RX ADMIN — Medication 40 MILLIGRAM(S): at 06:05

## 2018-03-03 RX ADMIN — ENOXAPARIN SODIUM 40 MILLIGRAM(S): 100 INJECTION SUBCUTANEOUS at 12:39

## 2018-03-03 RX ADMIN — Medication 100 MILLIGRAM(S): at 12:38

## 2018-03-03 RX ADMIN — Medication 1 TABLET(S): at 12:38

## 2018-03-03 NOTE — PROGRESS NOTE ADULT - SUBJECTIVE AND OBJECTIVE BOX
Chief Complaint: abdominal pain    HPI:   63y Female with HCC and possible renal mets, lying in bed, nad.  Pt appears much more comfortable today.  Pt states pain is better controlled.  No nausea or vomiting.  Pt was able to sleep overnight without any difficulties  + history of cocaine use in past.  Pt states that she uses it recreationally.  No use of heroin or other drugs.  Pt is alert and oriented but tends to ask the same questions over and over.  Pt is dnr and palliative consult is called.  Pt underwent paracentesis on last admission in february.  Pt to undergo another paracentesis when INR is wnl.        PAIN SCORE:    3/10     SCALE USED: (1-10 VNRS)    Allergies    penicillin (Anaphylaxis)    Intolerances      MEDICATIONS  (STANDING):  artificial  tears Solution 1 Drop(s) Both EYES three times a day  dextrose 5%. 1000 milliLiter(s) (50 mL/Hr) IV Continuous <Continuous>  dextrose 50% Injectable 12.5 Gram(s) IV Push once  dextrose 50% Injectable 25 Gram(s) IV Push once  dextrose 50% Injectable 25 Gram(s) IV Push once  enoxaparin Injectable 40 milliGRAM(s) SubCutaneous daily  ergocalciferol 30963 Unit(s) Oral <User Schedule>  fentaNYL   Patch  12 MICROgram(s)/Hr 1 Patch Transdermal every 72 hours  furosemide    Tablet 40 milliGRAM(s) Oral two times a day  gabapentin 100 milliGRAM(s) Oral at bedtime  insulin glargine Injectable (LANTUS) 10 Unit(s) SubCutaneous at bedtime  insulin lispro (HumaLOG) corrective regimen sliding scale   SubCutaneous three times a day before meals  metoprolol     tartrate 25 milliGRAM(s) Oral two times a day  multivitamin 1 Tablet(s) Oral daily  senna 2 Tablet(s) Oral at bedtime  thiamine 100 milliGRAM(s) Oral daily  tiotropium 18 MICROgram(s) Capsule 1 Capsule(s) Inhalation daily    MEDICATIONS  (PRN):  dextrose Gel 1 Dose(s) Oral once PRN Blood Glucose LESS THAN 70 milliGRAM(s)/deciliter  glucagon  Injectable 1 milliGRAM(s) IntraMuscular once PRN Glucose LESS THAN 70 milligrams/deciliter  HYDROmorphone  Injectable 0.5 milliGRAM(s) IV Push every 4 hours PRN Severe Pain (7 - 10)      PHYSICAL EXAM:    Vital Signs Last 24 Hrs  T(C): 36.9 (03 Mar 2018 04:46), Max: 37.3 (02 Mar 2018 14:12)  T(F): 98.5 (03 Mar 2018 04:46), Max: 99.2 (02 Mar 2018 14:12)  HR: 88 (03 Mar 2018 04:46) (88 - 113)  BP: 143/87 (03 Mar 2018 04:46) (140/89 - 145/79)  BP(mean): --  RR: 18 (03 Mar 2018 04:46) (18 - 19)  SpO2: 95% (03 Mar 2018 04:46) (95% - 97%)             LABS:                          13.2   17.3  )-----------( 99       ( 03 Mar 2018 06:13 )             42.8     03-03    136  |  98  |  22<H>  ----------------------------<  75  4.5   |  22  |  1.00    Ca    9.1      03 Mar 2018 06:13    TPro  6.6  /  Alb  1.7<L>  /  TBili  9.2<H>  /  DBili  x   /  AST  606<H>  /  ALT  92<H>  /  AlkPhos  268<H>  03-03    PT/INR - ( 02 Mar 2018 12:51 )   PT: 21.7 sec;   INR: 1.96 ratio         PTT - ( 02 Mar 2018 12:51 )  PTT:34.1 sec      Drug Screen:        RADIOLOGY:

## 2018-03-03 NOTE — PROGRESS NOTE ADULT - SUBJECTIVE AND OBJECTIVE BOX
Patient is a 63y old  Female who presents with a chief complaint of abdominal pain (01 Mar 2018 21:18)    PATIENT IS SEEN AND EXAMINED IN MEDICAL FLOOR.    ALLERGIES:  penicillin (Anaphylaxis)      Daily Weight in k.2 (03 Mar 2018 04:46)    VITALS:    Vital Signs Last 24 Hrs  T(C): 36.9 (03 Mar 2018 04:46), Max: 37.3 (02 Mar 2018 14:12)  T(F): 98.5 (03 Mar 2018 04:46), Max: 99.2 (02 Mar 2018 14:12)  HR: 92 (03 Mar 2018 09:55) (88 - 113)  BP: 123/82 (03 Mar 2018 09:55) (123/82 - 145/79)  BP(mean): --  RR: 18 (03 Mar 2018 04:46) (18 - 19)  SpO2: 94% (03 Mar 2018 09:55) (94% - 97%)    LABS:  CBC Full  -  ( 03 Mar 2018 06:13 )  WBC Count : 17.3 K/uL  Hemoglobin : 13.2 g/dL  Hematocrit : 42.8 %  Platelet Count - Automated : 99 K/uL  Mean Cell Volume : 93.1 fl  Mean Cell Hemoglobin : 28.8 pg  Mean Cell Hemoglobin Concentration : 30.9 gm/dL  Auto Neutrophil # : 13.2 K/uL  Auto Lymphocyte # : 2.7 K/uL  Auto Monocyte # : 1.4 K/uL  Auto Eosinophil # : 0.0 K/uL  Auto Basophil # : 0.2 K/uL  Auto Neutrophil % : 75.8 %  Auto Lymphocyte % : 15.4 %  Auto Monocyte % : 7.8 %  Auto Eosinophil % : 0.1 %  Auto Basophil % : 0.9 %    PT/INR - ( 02 Mar 2018 12:51 )   PT: 21.7 sec;   INR: 1.96 ratio         PTT - ( 02 Mar 2018 12:51 )  PTT:34.1 sec      136  |  98  |  22<H>  ----------------------------<  75  4.5   |  22  |  1.00    Ca    9.1      03 Mar 2018 06:13    TPro  6.6  /  Alb  1.7<L>  /  TBili  9.2<H>  /  DBili  x   /  AST  606<H>  /  ALT  92<H>  /  AlkPhos  268<H>      CAPILLARY BLOOD GLUCOSE      POCT Blood Glucose.: 101 mg/dL (03 Mar 2018 11:23)  POCT Blood Glucose.: 82 mg/dL (03 Mar 2018 07:53)  POCT Blood Glucose.: 115 mg/dL (02 Mar 2018 21:04)  POCT Blood Glucose.: 100 mg/dL (02 Mar 2018 16:21)        LIVER FUNCTIONS - ( 03 Mar 2018 06:13 )  Alb: 1.7 g/dL / Pro: 6.6 g/dL / ALK PHOS: 268 U/L / ALT: 92 U/L DA / AST: 606 U/L / GGT: x             MEDICATIONS:    MEDICATIONS  (STANDING):  artificial  tears Solution 1 Drop(s) Both EYES three times a day  dextrose 5%. 1000 milliLiter(s) (50 mL/Hr) IV Continuous <Continuous>  dextrose 50% Injectable 12.5 Gram(s) IV Push once  dextrose 50% Injectable 25 Gram(s) IV Push once  dextrose 50% Injectable 25 Gram(s) IV Push once  enoxaparin Injectable 40 milliGRAM(s) SubCutaneous daily  ergocalciferol 96076 Unit(s) Oral <User Schedule>  fentaNYL   Patch  12 MICROgram(s)/Hr 1 Patch Transdermal every 72 hours  furosemide    Tablet 40 milliGRAM(s) Oral two times a day  gabapentin 200 milliGRAM(s) Oral at bedtime  insulin glargine Injectable (LANTUS) 10 Unit(s) SubCutaneous at bedtime  insulin lispro (HumaLOG) corrective regimen sliding scale   SubCutaneous three times a day before meals  metoprolol     tartrate 25 milliGRAM(s) Oral two times a day  multivitamin 1 Tablet(s) Oral daily  polyethylene glycol 3350 17 Gram(s) Oral daily  senna 2 Tablet(s) Oral at bedtime  thiamine 100 milliGRAM(s) Oral daily  tiotropium 18 MICROgram(s) Capsule 1 Capsule(s) Inhalation daily      MEDICATIONS  (PRN):  dextrose Gel 1 Dose(s) Oral once PRN Blood Glucose LESS THAN 70 milliGRAM(s)/deciliter  glucagon  Injectable 1 milliGRAM(s) IntraMuscular once PRN Glucose LESS THAN 70 milligrams/deciliter  HYDROmorphone  Injectable 0.5 milliGRAM(s) IV Push every 4 hours PRN Severe Pain (7 - 10)      REVIEW OF SYSTEMS:                           ALL ROS DONE [ X   ]    CONSTITUTIONAL:  LETHARGIC [   ], FEVER [   ], UNRESPONSIVE [   ]  CVS:  CP  [   ], SOB, [   ], PALPITATIONS [   ], DIZZYNESS [   ]  RS: COUGH [   ], SPUTUM [   ]  GI: ABDOMINAL PAIN [   ], NAUSEA [   ], VOMITINGS [   ], DIARRHEA [   ], CONSTIPATION [   ]  :  DYSURIA [   ], NOCTURIA [   ], INCREASED FREQUENCY [   ], DRIBLING [   ],  SKELETAL: PAINFUL JOINTS [   ], SWOLLEN JOINTS [   ], NECK ACHE [   ], LOW BACK ACHE [   ],  SKIN : ULCERS [   ], RASH [   ], ITCHING [   ]  CNS: HEAD ACHE [   ], DOUBLE VISION [   ], BLURRED VISION [   ], AMS / CONFUSION [   ], SEIZURES [   ], WEAKNESS [   ],TINGLING / NUMBNESS [   ]    PHYSICAL EXAMINATION:  GENERAL APPEARANCE: NO DISTRESS  HEENT:  NO PALLOR, NO  JVD,  NO   NODES, NECK SUPPLE  CVS: S1 +, S2 +,   RS: AEEB,  OCCASIONAL  RALES +,   NO RONCHI  ABD: SOFT ,  BS + , ascites +  EXT: NO PE  SKIN: WARM,   SKELETAL:  ROM ACCEPTABLE  CNS:  AAO X  2-3  ,no   DEFICITS    RADIOLOGY :      ASSESSMENT :     Primary malignant neoplasm of liver  Yes  Liver cancer, primary, with metastasis from liver to other site  Bilateral edema of lower extremity  Herpes zoster without complication  Diabetes mellitus type 2, insulin dependent  Hepatocellular carcinoma  Hepatitis C  Varicose veins  Secondary hypertension, hypertension with unspecified goal  Other specified diabetes mellitus with unspecified complications  COPD (chronic obstructive pulmonary disease)  CHF (congestive heart failure)  History of liver biopsy  H/O eye surgery  H/O lumpectomy  No significant past surgical history      PLAN:  HPI:  64 yo F from home , with a hx of HCC involving 2 lobes of liver diagnosed in 2016 s/p IR embolization, COPD (not on home O2), DM, Hep C , HTN, Herpes Zoster sent to ED from home by visiting home services when they found her on bed condition at home. Complaints of abdominal pain which is chronic since diagnosis of HCC.  Denies nausea, vomiting, diarrhea, fever, chills, cough. (01 Mar 2018 21:18)    - RECURRENCE OF METASTATIC HEPATOCELLULAR CARCINOMA WITH WORSENING B/L LOWER EXTREMITY LYMPHEDEMA  - POOR PROGNOSIS   - AWAITING ON PLACEMENT IN AN INSTITUTION ON HOSPICE CARE  - PAIN MANAGEMENT   - GI AND DVT PROPHYLAXIS  - DR. CLEANING

## 2018-03-03 NOTE — PROGRESS NOTE ADULT - PROBLEM SELECTOR PLAN 1
- continue with fentanyl 12mcg/hr td q 72 hours   - increase gabapentin to 200mg po q hs  - hydromorphone 0.5mg ivp q 4 hours prn, will transition to po next week  - paracentesis once inr wnl

## 2018-03-04 LAB
GLUCOSE BLDC GLUCOMTR-MCNC: 113 MG/DL — HIGH (ref 70–99)
GLUCOSE BLDC GLUCOMTR-MCNC: 121 MG/DL — HIGH (ref 70–99)
GLUCOSE BLDC GLUCOMTR-MCNC: 81 MG/DL — SIGNIFICANT CHANGE UP (ref 70–99)
GLUCOSE BLDC GLUCOMTR-MCNC: 91 MG/DL — SIGNIFICANT CHANGE UP (ref 70–99)

## 2018-03-04 RX ADMIN — HYDROMORPHONE HYDROCHLORIDE 0.5 MILLIGRAM(S): 2 INJECTION INTRAMUSCULAR; INTRAVENOUS; SUBCUTANEOUS at 11:29

## 2018-03-04 RX ADMIN — INSULIN GLARGINE 10 UNIT(S): 100 INJECTION, SOLUTION SUBCUTANEOUS at 22:53

## 2018-03-04 RX ADMIN — HYDROMORPHONE HYDROCHLORIDE 0.5 MILLIGRAM(S): 2 INJECTION INTRAMUSCULAR; INTRAVENOUS; SUBCUTANEOUS at 02:30

## 2018-03-04 RX ADMIN — GABAPENTIN 200 MILLIGRAM(S): 400 CAPSULE ORAL at 22:53

## 2018-03-04 RX ADMIN — POLYETHYLENE GLYCOL 3350 17 GRAM(S): 17 POWDER, FOR SOLUTION ORAL at 13:01

## 2018-03-04 RX ADMIN — Medication 40 MILLIGRAM(S): at 06:05

## 2018-03-04 RX ADMIN — HYDROMORPHONE HYDROCHLORIDE 0.5 MILLIGRAM(S): 2 INJECTION INTRAMUSCULAR; INTRAVENOUS; SUBCUTANEOUS at 20:56

## 2018-03-04 RX ADMIN — ENOXAPARIN SODIUM 40 MILLIGRAM(S): 100 INJECTION SUBCUTANEOUS at 13:00

## 2018-03-04 RX ADMIN — SENNA PLUS 2 TABLET(S): 8.6 TABLET ORAL at 22:53

## 2018-03-04 RX ADMIN — Medication 100 MILLIGRAM(S): at 13:01

## 2018-03-04 RX ADMIN — Medication 1 DROP(S): at 13:00

## 2018-03-04 RX ADMIN — Medication 1 DROP(S): at 06:05

## 2018-03-04 RX ADMIN — HYDROMORPHONE HYDROCHLORIDE 0.5 MILLIGRAM(S): 2 INJECTION INTRAMUSCULAR; INTRAVENOUS; SUBCUTANEOUS at 15:38

## 2018-03-04 RX ADMIN — HYDROMORPHONE HYDROCHLORIDE 0.5 MILLIGRAM(S): 2 INJECTION INTRAMUSCULAR; INTRAVENOUS; SUBCUTANEOUS at 10:03

## 2018-03-04 RX ADMIN — HYDROMORPHONE HYDROCHLORIDE 0.5 MILLIGRAM(S): 2 INJECTION INTRAMUSCULAR; INTRAVENOUS; SUBCUTANEOUS at 01:45

## 2018-03-04 RX ADMIN — Medication 25 MILLIGRAM(S): at 06:05

## 2018-03-04 RX ADMIN — Medication 1 TABLET(S): at 13:01

## 2018-03-04 RX ADMIN — Medication 25 MILLIGRAM(S): at 19:21

## 2018-03-04 RX ADMIN — HYDROMORPHONE HYDROCHLORIDE 0.5 MILLIGRAM(S): 2 INJECTION INTRAMUSCULAR; INTRAVENOUS; SUBCUTANEOUS at 18:51

## 2018-03-04 RX ADMIN — TIOTROPIUM BROMIDE 1 CAPSULE(S): 18 CAPSULE ORAL; RESPIRATORY (INHALATION) at 13:00

## 2018-03-04 RX ADMIN — Medication 1 DROP(S): at 22:53

## 2018-03-04 RX ADMIN — HYDROMORPHONE HYDROCHLORIDE 0.5 MILLIGRAM(S): 2 INJECTION INTRAMUSCULAR; INTRAVENOUS; SUBCUTANEOUS at 21:31

## 2018-03-04 NOTE — PROGRESS NOTE ADULT - SUBJECTIVE AND OBJECTIVE BOX
pt know to me from last admission  recurrent hepatoma, not able to do embolization due to poor liver function  she was admitted for abdominal pain and lethargy  awake but can not answer question. chest clear. abd distended  1=edema                        13.2   17.3  )-----------( 99       ( 03 Mar 2018 06:13 )             42.8   03-03    136  |  98  |  22<H>  ----------------------------<  75  4.5   |  22  |  1.00    Ca    9.1      03 Mar 2018 06:13    TPro  6.6  /  Alb  1.7<L>  /  TBili  9.2<H>  /  DBili  x   /  AST  606<H>  /  ALT  92<H>  /  AlkPhos  268<H>  03-03  worsening liver function, can not give nexavar  terminal stage hepatoma  hospice care

## 2018-03-04 NOTE — PROGRESS NOTE ADULT - SUBJECTIVE AND OBJECTIVE BOX
Patient is a 63y old  Female who presents with a chief complaint of abdominal pain (01 Mar 2018 21:18)    PATIENT IS SEEN AND EXAMINED IN MEDICAL FLOOR.      ALLERGIES:  penicillin (Anaphylaxis)     Daily Weight in k (04 Mar 2018 05:05)    VITALS:    Vital Signs Last 24 Hrs  T(C): 36.9 (04 Mar 2018 05:05), Max: 37.1 (03 Mar 2018 14:16)  T(F): 98.5 (04 Mar 2018 05:05), Max: 98.8 (03 Mar 2018 14:16)  HR: 95 (04 Mar 2018 05:05) (90 - 113)  BP: 123/80 (04 Mar 2018 05:05) (115/66 - 127/82)  BP(mean): --  RR: 18 (04 Mar 2018 05:05) (17 - 18)  SpO2: 94% (04 Mar 2018 05:05) (94% - 97%)    LABS:  CBC Full  -  ( 03 Mar 2018 06:13 )  WBC Count : 17.3 K/uL  Hemoglobin : 13.2 g/dL  Hematocrit : 42.8 %  Platelet Count - Automated : 99 K/uL  Mean Cell Volume : 93.1 fl  Mean Cell Hemoglobin : 28.8 pg  Mean Cell Hemoglobin Concentration : 30.9 gm/dL  Auto Neutrophil # : 13.2 K/uL  Auto Lymphocyte # : 2.7 K/uL  Auto Monocyte # : 1.4 K/uL  Auto Eosinophil # : 0.0 K/uL  Auto Basophil # : 0.2 K/uL  Auto Neutrophil % : 75.8 %  Auto Lymphocyte % : 15.4 %  Auto Monocyte % : 7.8 %  Auto Eosinophil % : 0.1 %  Auto Basophil % : 0.9 %    PT/INR - ( 02 Mar 2018 12:51 )   PT: 21.7 sec;   INR: 1.96 ratio         PTT - ( 02 Mar 2018 12:51 )  PTT:34.1 sec      136  |  98  |  22<H>  ----------------------------<  75  4.5   |  22  |  1.00    Ca    9.1      03 Mar 2018 06:13    TPro  6.6  /  Alb  1.7<L>  /  TBili  9.2<H>  /  DBili  x   /  AST  606<H>  /  ALT  92<H>  /  AlkPhos  268<H>      CAPILLARY BLOOD GLUCOSE    POCT Blood Glucose.: 81 mg/dL (04 Mar 2018 07:36)  POCT Blood Glucose.: 115 mg/dL (03 Mar 2018 21:30)  POCT Blood Glucose.: 130 mg/dL (03 Mar 2018 16:43)  POCT Blood Glucose.: 101 mg/dL (03 Mar 2018 11:23)    LIVER FUNCTIONS - ( 03 Mar 2018 06:13 )  Alb: 1.7 g/dL / Pro: 6.6 g/dL / ALK PHOS: 268 U/L / ALT: 92 U/L DA / AST: 606 U/L / GGT: x             MEDICATIONS:    MEDICATIONS  (STANDING):  artificial  tears Solution 1 Drop(s) Both EYES three times a day  dextrose 5%. 1000 milliLiter(s) (50 mL/Hr) IV Continuous <Continuous>  dextrose 50% Injectable 12.5 Gram(s) IV Push once  dextrose 50% Injectable 25 Gram(s) IV Push once  dextrose 50% Injectable 25 Gram(s) IV Push once  enoxaparin Injectable 40 milliGRAM(s) SubCutaneous daily  ergocalciferol 86119 Unit(s) Oral <User Schedule>  fentaNYL   Patch  12 MICROgram(s)/Hr 1 Patch Transdermal every 72 hours  furosemide    Tablet 40 milliGRAM(s) Oral two times a day  gabapentin 200 milliGRAM(s) Oral at bedtime  insulin glargine Injectable (LANTUS) 10 Unit(s) SubCutaneous at bedtime  insulin lispro (HumaLOG) corrective regimen sliding scale   SubCutaneous three times a day before meals  metoprolol     tartrate 25 milliGRAM(s) Oral two times a day  multivitamin 1 Tablet(s) Oral daily  polyethylene glycol 3350 17 Gram(s) Oral daily  senna 2 Tablet(s) Oral at bedtime  thiamine 100 milliGRAM(s) Oral daily  tiotropium 18 MICROgram(s) Capsule 1 Capsule(s) Inhalation daily      MEDICATIONS  (PRN):  dextrose Gel 1 Dose(s) Oral once PRN Blood Glucose LESS THAN 70 milliGRAM(s)/deciliter  glucagon  Injectable 1 milliGRAM(s) IntraMuscular once PRN Glucose LESS THAN 70 milligrams/deciliter  HYDROmorphone  Injectable 0.5 milliGRAM(s) IV Push every 4 hours PRN Severe Pain (7 - 10)      REVIEW OF SYSTEMS:                           ALL ROS DONE [ X   ]    CONSTITUTIONAL:  LETHARGIC [   ], FEVER [   ], UNRESPONSIVE [   ]  CVS:  CP  [   ], SOB, [   ], PALPITATIONS [   ], DIZZYNESS [   ]  RS: COUGH [   ], SPUTUM [   ]  GI: ABDOMINAL PAIN [   ], NAUSEA [   ], VOMITINGS [   ], DIARRHEA [   ], CONSTIPATION [   ]  :  DYSURIA [   ], NOCTURIA [   ], INCREASED FREQUENCY [   ], DRIBLING [   ],  SKELETAL: PAINFUL JOINTS [   ], SWOLLEN JOINTS [   ], NECK ACHE [   ], LOW BACK ACHE [   ],  SKIN : ULCERS [   ], RASH [   ], ITCHING [   ]  CNS: HEAD ACHE [   ], DOUBLE VISION [   ], BLURRED VISION [   ], AMS / CONFUSION [   ], SEIZURES [   ], WEAKNESS [   ],TINGLING / NUMBNESS [   ]    PHYSICAL EXAMINATION:  GENERAL APPEARANCE: NO DISTRESS  HEENT:  NO PALLOR, NO  JVD,  NO   NODES, NECK SUPPLE  CVS: S1 +, S2 +,   RS: AEEB,  OCCASIONAL  RALES +,   NO RONCHI  ABD: SOFT ,  BS + , ascites +  EXT: NO PE  SKIN: WARM,   SKELETAL:  ROM ACCEPTABLE  CNS:  AAO X  2-3  ,no   DEFICITS    RADIOLOGY :      ASSESSMENT :     Primary malignant neoplasm of liver  Yes  Liver cancer, primary, with metastasis from liver to other site  Bilateral edema of lower extremity  Herpes zoster without complication  Diabetes mellitus type 2, insulin dependent  Hepatocellular carcinoma  Hepatitis C  Varicose veins  Secondary hypertension, hypertension with unspecified goal  Other specified diabetes mellitus with unspecified complications  COPD (chronic obstructive pulmonary disease)  CHF (congestive heart failure)  History of liver biopsy  H/O eye surgery  H/O lumpectomy      PLAN:  HPI:  64 yo F from home , with a hx of HCC involving 2 lobes of liver diagnosed in 2016 s/p IR embolization, COPD (not on home O2), DM, Hep C , HTN, Herpes Zoster sent to ED from home by visiting home services when they found her on bed condition at home. Complaints of abdominal pain which is chronic since diagnosis of HCC.  Denies nausea, vomiting, diarrhea, fever, chills, cough. (01 Mar 2018 21:18)    - RECURRENCE OF METASTATIC HEPATOCELLULAR CARCINOMA WITH WORSENING B/L LOWER EXTREMITY LYMPHEDEMA  - POOR PROGNOSIS   - AWAITING ON PLACEMENT IN AN INSTITUTION ON HOSPICE CARE  - PAIN MANAGEMENT AS NEEDED  - GI AND DVT PROPHYLAXIS  - DR. CLEANING

## 2018-03-05 DIAGNOSIS — G89.3 NEOPLASM RELATED PAIN (ACUTE) (CHRONIC): ICD-10-CM

## 2018-03-05 DIAGNOSIS — R53.81 OTHER MALAISE: ICD-10-CM

## 2018-03-05 DIAGNOSIS — G93.40 ENCEPHALOPATHY, UNSPECIFIED: ICD-10-CM

## 2018-03-05 DIAGNOSIS — Z51.5 ENCOUNTER FOR PALLIATIVE CARE: ICD-10-CM

## 2018-03-05 DIAGNOSIS — C22.8 MALIGNANT NEOPLASM OF LIVER, PRIMARY, UNSPECIFIED AS TO TYPE: ICD-10-CM

## 2018-03-05 LAB
GLUCOSE BLDC GLUCOMTR-MCNC: 105 MG/DL — HIGH (ref 70–99)
GLUCOSE BLDC GLUCOMTR-MCNC: 123 MG/DL — HIGH (ref 70–99)
GLUCOSE BLDC GLUCOMTR-MCNC: 86 MG/DL — SIGNIFICANT CHANGE UP (ref 70–99)
GLUCOSE BLDC GLUCOMTR-MCNC: 92 MG/DL — SIGNIFICANT CHANGE UP (ref 70–99)

## 2018-03-05 PROCEDURE — 99223 1ST HOSP IP/OBS HIGH 75: CPT

## 2018-03-05 RX ORDER — LACTULOSE 10 G/15ML
20 SOLUTION ORAL
Qty: 0 | Refills: 0 | Status: DISCONTINUED | OUTPATIENT
Start: 2018-03-05 | End: 2018-03-08

## 2018-03-05 RX ADMIN — ENOXAPARIN SODIUM 40 MILLIGRAM(S): 100 INJECTION SUBCUTANEOUS at 11:21

## 2018-03-05 RX ADMIN — Medication 1 DROP(S): at 05:19

## 2018-03-05 RX ADMIN — TIOTROPIUM BROMIDE 1 CAPSULE(S): 18 CAPSULE ORAL; RESPIRATORY (INHALATION) at 11:22

## 2018-03-05 RX ADMIN — SENNA PLUS 2 TABLET(S): 8.6 TABLET ORAL at 21:01

## 2018-03-05 RX ADMIN — HYDROMORPHONE HYDROCHLORIDE 0.5 MILLIGRAM(S): 2 INJECTION INTRAMUSCULAR; INTRAVENOUS; SUBCUTANEOUS at 03:52

## 2018-03-05 RX ADMIN — HYDROMORPHONE HYDROCHLORIDE 0.5 MILLIGRAM(S): 2 INJECTION INTRAMUSCULAR; INTRAVENOUS; SUBCUTANEOUS at 03:18

## 2018-03-05 RX ADMIN — FENTANYL CITRATE 1 PATCH: 50 INJECTION INTRAVENOUS at 20:59

## 2018-03-05 RX ADMIN — Medication 1 DROP(S): at 21:24

## 2018-03-05 RX ADMIN — HYDROMORPHONE HYDROCHLORIDE 0.5 MILLIGRAM(S): 2 INJECTION INTRAMUSCULAR; INTRAVENOUS; SUBCUTANEOUS at 13:50

## 2018-03-05 RX ADMIN — Medication 1 TABLET(S): at 11:21

## 2018-03-05 RX ADMIN — POLYETHYLENE GLYCOL 3350 17 GRAM(S): 17 POWDER, FOR SOLUTION ORAL at 11:50

## 2018-03-05 RX ADMIN — LACTULOSE 20 GRAM(S): 10 SOLUTION ORAL at 17:55

## 2018-03-05 RX ADMIN — Medication 100 MILLIGRAM(S): at 11:21

## 2018-03-05 RX ADMIN — HYDROMORPHONE HYDROCHLORIDE 0.5 MILLIGRAM(S): 2 INJECTION INTRAMUSCULAR; INTRAVENOUS; SUBCUTANEOUS at 21:00

## 2018-03-05 RX ADMIN — GABAPENTIN 200 MILLIGRAM(S): 400 CAPSULE ORAL at 21:01

## 2018-03-05 RX ADMIN — HYDROMORPHONE HYDROCHLORIDE 0.5 MILLIGRAM(S): 2 INJECTION INTRAMUSCULAR; INTRAVENOUS; SUBCUTANEOUS at 22:16

## 2018-03-05 RX ADMIN — HYDROMORPHONE HYDROCHLORIDE 0.5 MILLIGRAM(S): 2 INJECTION INTRAMUSCULAR; INTRAVENOUS; SUBCUTANEOUS at 12:59

## 2018-03-05 RX ADMIN — FENTANYL CITRATE 1 PATCH: 50 INJECTION INTRAVENOUS at 21:01

## 2018-03-05 RX ADMIN — Medication 40 MILLIGRAM(S): at 17:55

## 2018-03-05 RX ADMIN — Medication 1 DROP(S): at 13:02

## 2018-03-05 NOTE — CONSULT NOTE ADULT - PROBLEM SELECTOR RECOMMENDATION 3
pt with albumin level of 1.7 likely 2/2 cancer   discussed with sister that pr is currently not able to return home and will need long term placement, will benefit from hospice and is interested   f/u social work consult pt with history of hepatocellular carcinoma with worsening liver function  currently at terminal stage hepatoma not a candidate for chemotherapy   pt is a candidate to residential hospice  currently pain under controlled

## 2018-03-05 NOTE — CONSULT NOTE ADULT - PROBLEM SELECTOR RECOMMENDATION 5
Spoke over the phone with sister and next of kin [facility] [phone] Cell [phone], regarding GOC and discussed pts current condition, agrees for reasonable interventions to help pt but does not want aggressive measures, understands pt's likely disease trajectory and progressive decline from liver cancer  She agreed for long term placement due to deconditioning and ok with inpt hospice vs residential hospice pending pt's condition  f/u social work consult

## 2018-03-05 NOTE — CONSULT NOTE ADULT - PROBLEM SELECTOR RECOMMENDATION 9
pt with history of hepatocellular carcinoma with worsening liver function  currently at terminal stage hepatoma not a candidate for chemotherapy   pt is a candidate to residential hospice  currently pain under controlled   c/w Fentanyl patch, Gabapentin and PRN Dilaudid  f/u pain management recommendations pt had progressive decline in mental status since admission, confused and with decreased oral intake   possible metabolic encephalopathy 2/2 due to progressive liver failure and obstructive jaundice, LFTs and bilirubin increasing (unlikely due to opioids)  rec ammonia and recommend starting lactulose as tolerated
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
## 2018-03-05 NOTE — PROGRESS NOTE ADULT - SUBJECTIVE AND OBJECTIVE BOX
INTERVAL HPI/OVERNIGHT EVENTS:    No acute events, pt is more lethargic today, over the weekend breathing improved.   awake but can not answer question. no sign of being in pain    VITAL SIGNS:  T(F): 98.6 (03-05-18 @ 14:10)  HR: 120 (03-05-18 @ 14:10)  BP: 93/73 (03-05-18 @ 14:10)  RR: 18 (03-05-18 @ 14:10)  SpO2: 99% (03-05-18 @ 14:10)  Wt(kg): --    PHYSICAL EXAM:  Gen - lethargic, WD, obese  neuro - awake but not answering questions   CV - no M/R/G, s1, s2 +nt  GI - BS +nt in all qs, abd distended, tender  lungs - clear b/l , No W/R?R  Extremity - 1+ pitting edema     MEDICATIONS  (STANDING):  artificial  tears Solution 1 Drop(s) Both EYES three times a day  dextrose 5%. 1000 milliLiter(s) (50 mL/Hr) IV Continuous <Continuous>  dextrose 50% Injectable 12.5 Gram(s) IV Push once  dextrose 50% Injectable 25 Gram(s) IV Push once  dextrose 50% Injectable 25 Gram(s) IV Push once  enoxaparin Injectable 40 milliGRAM(s) SubCutaneous daily  ergocalciferol 18345 Unit(s) Oral <User Schedule>  fentaNYL   Patch  12 MICROgram(s)/Hr 1 Patch Transdermal every 72 hours  furosemide    Tablet 40 milliGRAM(s) Oral two times a day  gabapentin 200 milliGRAM(s) Oral at bedtime  insulin glargine Injectable (LANTUS) 10 Unit(s) SubCutaneous at bedtime  insulin lispro (HumaLOG) corrective regimen sliding scale   SubCutaneous three times a day before meals  lactulose Syrup 20 Gram(s) Oral two times a day  metoprolol     tartrate 25 milliGRAM(s) Oral two times a day  multivitamin 1 Tablet(s) Oral daily  polyethylene glycol 3350 17 Gram(s) Oral daily  senna 2 Tablet(s) Oral at bedtime  thiamine 100 milliGRAM(s) Oral daily  tiotropium 18 MICROgram(s) Capsule 1 Capsule(s) Inhalation daily    MEDICATIONS  (PRN):  dextrose Gel 1 Dose(s) Oral once PRN Blood Glucose LESS THAN 70 milliGRAM(s)/deciliter  glucagon  Injectable 1 milliGRAM(s) IntraMuscular once PRN Glucose LESS THAN 70 milligrams/deciliter  HYDROmorphone  Injectable 0.5 milliGRAM(s) IV Push every 4 hours PRN Severe Pain (7 - 10)      Allergies    penicillin (Anaphylaxis)    Intolerances        LABS:  not following labs today  will f.u on labs tomorrow                  RADIOLOGY & ADDITIONAL TESTS:
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
## 2018-03-05 NOTE — CONSULT NOTE ADULT - ASSESSMENT
64 yo F from home , with a hx of HCC involving 2 lobes of liver diagnosed in 6/2016 s/p IR embolization, COPD (not on home O2), DM, Hep C , HTN, Herpes Zoster sent to ED from home by visiting home services when they found her on bed condition at home. Complaints of abdominal pain which is chronic since diagnosis of HCC.  Denies nausea, vomiting, diarrhea, fever, chills, cough. (01 Mar 2018 21:18)

## 2018-03-05 NOTE — CONSULT NOTE ADULT - PROBLEM SELECTOR RECOMMENDATION 2
pt had progressive decline in mental status since admission, confused and with decreased oral intake   possible metabolic encephalopathy 2/2 elevated ammonia level   recommend starting lactulose and to f/u ammonia level continue with fentanyl patch and dilaudid prn and neurontin as per pain management

## 2018-03-06 LAB
ALBUMIN SERPL ELPH-MCNC: 1.6 G/DL — LOW (ref 3.5–5)
ALP SERPL-CCNC: 343 U/L — HIGH (ref 40–120)
ALT FLD-CCNC: 408 U/L DA — HIGH (ref 10–60)
AMMONIA BLD-MCNC: 84 UMOL/L — HIGH (ref 11–32)
ANION GAP SERPL CALC-SCNC: 22 MMOL/L — HIGH (ref 5–17)
AST SERPL-CCNC: 3049 U/L — HIGH (ref 10–40)
BILIRUB SERPL-MCNC: 16.2 MG/DL — HIGH (ref 0.2–1.2)
BUN SERPL-MCNC: 78 MG/DL — HIGH (ref 7–18)
CALCIUM SERPL-MCNC: 8.4 MG/DL — SIGNIFICANT CHANGE UP (ref 8.4–10.5)
CHLORIDE SERPL-SCNC: 97 MMOL/L — SIGNIFICANT CHANGE UP (ref 96–108)
CO2 SERPL-SCNC: 20 MMOL/L — LOW (ref 22–31)
CREAT SERPL-MCNC: 3.96 MG/DL — HIGH (ref 0.5–1.3)
GLUCOSE BLDC GLUCOMTR-MCNC: 76 MG/DL — SIGNIFICANT CHANGE UP (ref 70–99)
GLUCOSE BLDC GLUCOMTR-MCNC: 91 MG/DL — SIGNIFICANT CHANGE UP (ref 70–99)
GLUCOSE BLDC GLUCOMTR-MCNC: 92 MG/DL — SIGNIFICANT CHANGE UP (ref 70–99)
GLUCOSE BLDC GLUCOMTR-MCNC: 97 MG/DL — SIGNIFICANT CHANGE UP (ref 70–99)
GLUCOSE SERPL-MCNC: 98 MG/DL — SIGNIFICANT CHANGE UP (ref 70–99)
HCT VFR BLD CALC: 42.9 % — SIGNIFICANT CHANGE UP (ref 34.5–45)
HGB BLD-MCNC: 12.9 G/DL — SIGNIFICANT CHANGE UP (ref 11.5–15.5)
LYMPHOCYTES # BLD AUTO: 12 % — LOW (ref 13–44)
MCHC RBC-ENTMCNC: 27.7 PG — SIGNIFICANT CHANGE UP (ref 27–34)
MCHC RBC-ENTMCNC: 30.2 GM/DL — LOW (ref 32–36)
MCV RBC AUTO: 91.9 FL — SIGNIFICANT CHANGE UP (ref 80–100)
MONOCYTES NFR BLD AUTO: 6 % — SIGNIFICANT CHANGE UP (ref 2–14)
NEUTROPHILS NFR BLD AUTO: 66 % — SIGNIFICANT CHANGE UP (ref 43–77)
PLATELET # BLD AUTO: 70 K/UL — LOW (ref 150–400)
POTASSIUM SERPL-MCNC: 5.6 MMOL/L — HIGH (ref 3.5–5.3)
POTASSIUM SERPL-SCNC: 5.6 MMOL/L — HIGH (ref 3.5–5.3)
PROT SERPL-MCNC: 6.1 G/DL — SIGNIFICANT CHANGE UP (ref 6–8.3)
RBC # BLD: 4.67 M/UL — SIGNIFICANT CHANGE UP (ref 3.8–5.2)
RBC # FLD: 22.9 % — HIGH (ref 10.3–14.5)
SODIUM SERPL-SCNC: 139 MMOL/L — SIGNIFICANT CHANGE UP (ref 135–145)
WBC # BLD: 21.2 K/UL — HIGH (ref 3.8–10.5)
WBC # FLD AUTO: 21.2 K/UL — HIGH (ref 3.8–10.5)

## 2018-03-06 PROCEDURE — 99233 SBSQ HOSP IP/OBS HIGH 50: CPT | Mod: GC

## 2018-03-06 RX ORDER — HYDROMORPHONE HYDROCHLORIDE 2 MG/ML
0.5 INJECTION INTRAMUSCULAR; INTRAVENOUS; SUBCUTANEOUS ONCE
Qty: 0 | Refills: 0 | Status: DISCONTINUED | OUTPATIENT
Start: 2018-03-06 | End: 2018-03-06

## 2018-03-06 RX ORDER — HALOPERIDOL DECANOATE 100 MG/ML
0.5 INJECTION INTRAMUSCULAR ONCE
Qty: 0 | Refills: 0 | Status: COMPLETED | OUTPATIENT
Start: 2018-03-06 | End: 2018-03-06

## 2018-03-06 RX ORDER — HYDROMORPHONE HYDROCHLORIDE 2 MG/ML
0.5 INJECTION INTRAMUSCULAR; INTRAVENOUS; SUBCUTANEOUS EVERY 4 HOURS
Qty: 0 | Refills: 0 | Status: DISCONTINUED | OUTPATIENT
Start: 2018-03-06 | End: 2018-03-07

## 2018-03-06 RX ORDER — SODIUM POLYSTYRENE SULFONATE 4.1 MEQ/G
15 POWDER, FOR SUSPENSION ORAL ONCE
Qty: 0 | Refills: 0 | Status: COMPLETED | OUTPATIENT
Start: 2018-03-06 | End: 2018-03-06

## 2018-03-06 RX ADMIN — HYDROMORPHONE HYDROCHLORIDE 0.5 MILLIGRAM(S): 2 INJECTION INTRAMUSCULAR; INTRAVENOUS; SUBCUTANEOUS at 06:34

## 2018-03-06 RX ADMIN — SENNA PLUS 2 TABLET(S): 8.6 TABLET ORAL at 21:16

## 2018-03-06 RX ADMIN — Medication 40 MILLIGRAM(S): at 06:34

## 2018-03-06 RX ADMIN — HYDROMORPHONE HYDROCHLORIDE 0.5 MILLIGRAM(S): 2 INJECTION INTRAMUSCULAR; INTRAVENOUS; SUBCUTANEOUS at 06:58

## 2018-03-06 RX ADMIN — ENOXAPARIN SODIUM 40 MILLIGRAM(S): 100 INJECTION SUBCUTANEOUS at 12:39

## 2018-03-06 RX ADMIN — HYDROMORPHONE HYDROCHLORIDE 0.5 MILLIGRAM(S): 2 INJECTION INTRAMUSCULAR; INTRAVENOUS; SUBCUTANEOUS at 21:15

## 2018-03-06 RX ADMIN — SODIUM POLYSTYRENE SULFONATE 15 GRAM(S): 4.1 POWDER, FOR SUSPENSION ORAL at 18:40

## 2018-03-06 RX ADMIN — LACTULOSE 20 GRAM(S): 10 SOLUTION ORAL at 17:01

## 2018-03-06 RX ADMIN — HYDROMORPHONE HYDROCHLORIDE 0.5 MILLIGRAM(S): 2 INJECTION INTRAMUSCULAR; INTRAVENOUS; SUBCUTANEOUS at 10:15

## 2018-03-06 RX ADMIN — Medication 40 MILLIGRAM(S): at 17:01

## 2018-03-06 RX ADMIN — LACTULOSE 20 GRAM(S): 10 SOLUTION ORAL at 06:34

## 2018-03-06 RX ADMIN — HYDROMORPHONE HYDROCHLORIDE 0.5 MILLIGRAM(S): 2 INJECTION INTRAMUSCULAR; INTRAVENOUS; SUBCUTANEOUS at 23:30

## 2018-03-06 RX ADMIN — Medication 1 DROP(S): at 06:36

## 2018-03-06 RX ADMIN — HYDROMORPHONE HYDROCHLORIDE 0.5 MILLIGRAM(S): 2 INJECTION INTRAMUSCULAR; INTRAVENOUS; SUBCUTANEOUS at 23:33

## 2018-03-06 RX ADMIN — HYDROMORPHONE HYDROCHLORIDE 0.5 MILLIGRAM(S): 2 INJECTION INTRAMUSCULAR; INTRAVENOUS; SUBCUTANEOUS at 03:31

## 2018-03-06 RX ADMIN — HYDROMORPHONE HYDROCHLORIDE 0.5 MILLIGRAM(S): 2 INJECTION INTRAMUSCULAR; INTRAVENOUS; SUBCUTANEOUS at 19:45

## 2018-03-06 RX ADMIN — HYDROMORPHONE HYDROCHLORIDE 0.5 MILLIGRAM(S): 2 INJECTION INTRAMUSCULAR; INTRAVENOUS; SUBCUTANEOUS at 09:21

## 2018-03-06 RX ADMIN — Medication 1 DROP(S): at 21:16

## 2018-03-06 RX ADMIN — HYDROMORPHONE HYDROCHLORIDE 0.5 MILLIGRAM(S): 2 INJECTION INTRAMUSCULAR; INTRAVENOUS; SUBCUTANEOUS at 02:08

## 2018-03-06 NOTE — PROGRESS NOTE ADULT - SUBJECTIVE AND OBJECTIVE BOX
INTERVAL HPI/OVERNIGHT EVENTS:  No acute events, pt is in same lethargic today state, In am Pt is agitated and shouting , in the afternoon more calm.     VITAL SIGNS:  Vital Signs Last 24 Hrs  T(C): 36.8 (06 Mar 2018 14:22), Max: 36.9 (05 Mar 2018 20:57)  T(F): 98.3 (06 Mar 2018 14:22), Max: 98.4 (05 Mar 2018 20:57)  HR: 60 (06 Mar 2018 14:22) (60 - 91)  BP: 103/51 (06 Mar 2018 14:22) (103/51 - 119/77)  RR: 16 (06 Mar 2018 14:22) (16 - 17)  SpO2: 97% (06 Mar 2018 14:22) (92% - 97%)      PHYSICAL EXAM:  Gen - lethargic, WD, obese  neuro - awake but not answering questions   CV - no M/R/G, s1, s2 +nt  GI - BS +nt in all qs, abd distended, tender  lungs - clear b/l , No W/R/R  Extremity - 1+ pitting edema     MEDICATIONS  (STANDING):  artificial  tears Solution 1 Drop(s) Both EYES three times a day  dextrose 5%. 1000 milliLiter(s) (50 mL/Hr) IV Continuous <Continuous>  dextrose 50% Injectable 12.5 Gram(s) IV Push once  dextrose 50% Injectable 25 Gram(s) IV Push once  dextrose 50% Injectable 25 Gram(s) IV Push once  enoxaparin Injectable 40 milliGRAM(s) SubCutaneous daily  fentaNYL   Patch  12 MICROgram(s)/Hr 1 Patch Transdermal every 72 hours  furosemide    Tablet 40 milliGRAM(s) Oral two times a day  insulin lispro (HumaLOG) corrective regimen sliding scale   SubCutaneous three times a day before meals  lactulose Syrup 20 Gram(s) Oral two times a day  senna 2 Tablet(s) Oral at bedtime  tiotropium 18 MICROgram(s) Capsule 1 Capsule(s) Inhalation daily    MEDICATIONS  (PRN):  dextrose Gel 1 Dose(s) Oral once PRN Blood Glucose LESS THAN 70 milliGRAM(s)/deciliter  glucagon  Injectable 1 milliGRAM(s) IntraMuscular once PRN Glucose LESS THAN 70 milligrams/deciliter    Allergies    penicillin (Anaphylaxis)    Intolerances        LABS:                          12.9   21.2  )-----------( 70       ( 06 Mar 2018 07:48 )             42.9       03-06    139  |  97  |  78<H>  ----------------------------<  98  5.6<H>   |  20<L>  |  3.96<H>    Ca    8.4      06 Mar 2018 07:48    TPro  6.1  /  Alb  1.6<L>  /  TBili  16.2<H>  /  DBili  x   /  AST  3049<H>  /  ALT  408<H>  /  AlkPhos  343<H>  03-06            CAPILLARY BLOOD GLUCOSE      POCT Blood Glucose.: 92 mg/dL (06 Mar 2018 16:27)                  RADIOLOGY & ADDITIONAL TESTS:

## 2018-03-06 NOTE — PROGRESS NOTE ADULT - PROBLEM SELECTOR PLAN 1
pt had progressive decline in mental status since admission, confused and with decreased oral intake   possible metabolic encephalopathy 2/2 due hepatic encephalopathy (ammonia level 84)  obstructive jaundice, LFTs and bilirubin increasing (unlikely due to opioids)  c/w lactulose pt had progressive decline in mental status since admission, but over weekend, significant increase in letharfy, confused and with decreased oral intake   possible metabolic encephalopathy 2/2 due hepatorenal syndrome (ammonia level 84)  obstructive jaundice, LFTs and bilirubin rapidly increasing (unlikely due to opioids)  c/w lactulose as tolerated

## 2018-03-06 NOTE — PROGRESS NOTE ADULT - PROBLEM SELECTOR PLAN 2
continue with fentanyl patch and Dilaudid prn and Neurontin as per pain management  currently pain controlled continue with fentanyl patch and Dilaudid prn, d/c neurontin  currently pain controlled

## 2018-03-06 NOTE — PROGRESS NOTE ADULT - PROBLEM SELECTOR PLAN 3
pt with history of hepatocellular carcinoma with worsening liver function  currently at terminal stage hepatoma not a candidate for chemotherapy   pt is a candidate to residential hospice  currently pain under controlled pt with history of hepatocellular carcinoma with worsening liver function  currently at terminal stage hepatoma not a candidate for chemotherapy   pt may be a candidate for [place] now or inpt hospice (near future)

## 2018-03-06 NOTE — PROGRESS NOTE ADULT - PROBLEM SELECTOR PLAN 5
Spoke over the phone with sister and next of kin Trudy [facility] [phone] Cell [phone] yesterday, regarding GOC and discussed pts current condition, agrees for reasonable interventions to help pt but does not want aggressive measures, understands pt's likely disease trajectory and progressive decline from liver cancer  She agreed for long term placement due to deconditioning and ok with inpt hospice vs residential hospice pending pt's condition  f/u social work consult Spoke over the phone with sister and next of kin Trudy Goyal [place] [phone] Cell [phone] - agrees for [place] referral and if pt is eligible inpt hospice.

## 2018-03-06 NOTE — PROGRESS NOTE ADULT - SUBJECTIVE AND OBJECTIVE BOX
OVERNIGHT EVENTS: elevated ammonia level to 84, pt was started on lactulose yesterday with BM yesterday and AM. Ate diner yesterday.    Review of Systems: Unable to obtain due to poor mentation    MEDICATIONS  (STANDING):    ·	artificial  tears Solution 1 Drop(s) Both EYES three times a day  ·	dextrose 5%. 1000 milliLiter(s) (50 mL/Hr) IV Continuous <Continuous>  ·	dextrose 50% Injectable 12.5 Gram(s) IV Push once  ·	dextrose 50% Injectable 25 Gram(s) IV Push once  ·	dextrose 50% Injectable 25 Gram(s) IV Push once  ·	enoxaparin Injectable 40 milliGRAM(s) SubCutaneous daily  ·	ergocalciferol 85493 Unit(s) Oral <User Schedule>  ·	fentaNYL   Patch  12 MICROgram(s)/Hr 1 Patch Transdermal every 72 hours  ·	furosemide    Tablet 40 milliGRAM(s) Oral two times a day  ·	gabapentin 200 milliGRAM(s) Oral at bedtime  ·	insulin lispro (HumaLOG) corrective regimen sliding scale   SubCutaneous three times a day before meals  ·	lactulose Syrup 20 Gram(s) Oral two times a day  ·	multivitamin 1 Tablet(s) Oral daily  ·	polyethylene glycol 3350 17 Gram(s) Oral daily  ·	senna 2 Tablet(s) Oral at bedtime  ·	thiamine 100 milliGRAM(s) Oral daily  ·	tiotropium 18 MICROgram(s) Capsule 1 Capsule(s) Inhalation daily    MEDICATIONS  (PRN):    ·	dextrose Gel 1 Dose(s) Oral once PRN Blood Glucose LESS THAN 70 milliGRAM(s)/deciliter  ·	glucagon  Injectable 1 milliGRAM(s) IntraMuscular once PRN Glucose LESS THAN 70 milligrams/deciliter  ·	HYDROmorphone  Injectable 0.5 milliGRAM(s) IV Push every 4 hours PRN Severe Pain (7 - 10)      PHYSICAL EXAM:    Vital Signs Last 24 Hrs  ·	T(C): 36.3 (06 Mar 2018 06:30), Max: 37 (05 Mar 2018 14:10)  ·	T(F): 97.4 (06 Mar 2018 06:30), Max: 98.6 (05 Mar 2018 14:10)  ·	HR: 86 (06 Mar 2018 06:30) (86 - 120)  ·	BP: 114/54 (06 Mar 2018 06:30) (93/73 - 119/77)  ·	RR: 17 (06 Mar 2018 06:30) (17 - 18)  ·	SpO2: 92% (06 Mar 2018 06:30) (92% - 99%)  ·	General: alert  oriented x 1 (person) nonsensical speech   ·	Karnofsky Performance Score/Palliative Performance Status Version2:  30%    HEENT: normal    Lungs: comfortable   CV: sinus tachycardia  GI: distended with ascites, last BM: today in AM  : incontinent   Musculoskeletal: weakness, LE edema  Skin: normal    Neuro: cognitive impairment, altered mental status   Oral intake ability: moderate capability  Diet: regular     LABS:                          12.9   21.2  )-----------( x        ( 06 Mar 2018 07:48 )             42.9     03-06    139  |  97  |  78<H>  ----------------------------<  98  5.6<H>   |  20<L>  |  3.96<H>    Ca    8.4      06 Mar 2018 07:48    TPro  6.1  /  Alb  1.6<L>  /  TBili  16.2<H>  /  DBili  x   /  AST  3049<H>  /  ALT  408<H>  /  AlkPhos  343<H>  03-06 OVERNIGHT EVENTS: still lethargic, elevated ammonia level to 84, pt was started on lactulose yesterday with BM yesterday and AM. Ate diner yesterday.    Review of Systems: Unable to obtain due to poor mentation    MEDICATIONS  (STANDING):    ·	artificial  tears Solution 1 Drop(s) Both EYES three times a day  ·	dextrose 5%. 1000 milliLiter(s) (50 mL/Hr) IV Continuous <Continuous>  ·	dextrose 50% Injectable 12.5 Gram(s) IV Push once  ·	dextrose 50% Injectable 25 Gram(s) IV Push once  ·	dextrose 50% Injectable 25 Gram(s) IV Push once  ·	enoxaparin Injectable 40 milliGRAM(s) SubCutaneous daily  ·	ergocalciferol 30048 Unit(s) Oral <User Schedule>  ·	fentaNYL   Patch  12 MICROgram(s)/Hr 1 Patch Transdermal every 72 hours  ·	furosemide    Tablet 40 milliGRAM(s) Oral two times a day  ·	gabapentin 200 milliGRAM(s) Oral at bedtime  ·	insulin lispro (HumaLOG) corrective regimen sliding scale   SubCutaneous three times a day before meals  ·	lactulose Syrup 20 Gram(s) Oral two times a day  ·	multivitamin 1 Tablet(s) Oral daily  ·	polyethylene glycol 3350 17 Gram(s) Oral daily  ·	senna 2 Tablet(s) Oral at bedtime  ·	thiamine 100 milliGRAM(s) Oral daily  ·	tiotropium 18 MICROgram(s) Capsule 1 Capsule(s) Inhalation daily    MEDICATIONS  (PRN):    ·	dextrose Gel 1 Dose(s) Oral once PRN Blood Glucose LESS THAN 70 milliGRAM(s)/deciliter  ·	glucagon  Injectable 1 milliGRAM(s) IntraMuscular once PRN Glucose LESS THAN 70 milligrams/deciliter  ·	HYDROmorphone  Injectable 0.5 milliGRAM(s) IV Push every 4 hours PRN Severe Pain (7 - 10)      PHYSICAL EXAM:    Vital Signs Last 24 Hrs  ·	T(C): 36.3 (06 Mar 2018 06:30), Max: 37 (05 Mar 2018 14:10)  ·	T(F): 97.4 (06 Mar 2018 06:30), Max: 98.6 (05 Mar 2018 14:10)  ·	HR: 86 (06 Mar 2018 06:30) (86 - 120)  ·	BP: 114/54 (06 Mar 2018 06:30) (93/73 - 119/77)  ·	RR: 17 (06 Mar 2018 06:30) (17 - 18)  ·	SpO2: 92% (06 Mar 2018 06:30) (92% - 99%)  ·	General: alert  oriented x 1 (person) nonsensical speech   ·	Karnofsky Performance Score/Palliative Performance Status Version2:  30%    HEENT: normal    Lungs: comfortable   CV: sinus tachycardia  GI: distended with ascites, last BM: today in AM  : incontinent   Musculoskeletal: weakness, LE edema  Skin: normal    Neuro: cognitive impairment, altered mental status   Oral intake ability: moderate capability  Diet: regular     LABS:                          12.9   21.2  )-----------( x        ( 06 Mar 2018 07:48 )             42.9     03-06    139  |  97  |  78<H>  ----------------------------<  98  5.6<H>   |  20<L>  |  3.96<H>    Ca    8.4      06 Mar 2018 07:48    TPro  6.1  /  Alb  1.6<L>  /  TBili  16.2<H>  /  DBili  x   /  AST  3049<H>  /  ALT  408<H>  /  AlkPhos  343<H>  03-06

## 2018-03-07 DIAGNOSIS — R45.1 RESTLESSNESS AND AGITATION: ICD-10-CM

## 2018-03-07 LAB
GLUCOSE BLDC GLUCOMTR-MCNC: 104 MG/DL — HIGH (ref 70–99)
GLUCOSE BLDC GLUCOMTR-MCNC: 107 MG/DL — HIGH (ref 70–99)
GLUCOSE BLDC GLUCOMTR-MCNC: 134 MG/DL — HIGH (ref 70–99)
GLUCOSE BLDC GLUCOMTR-MCNC: 94 MG/DL — SIGNIFICANT CHANGE UP (ref 70–99)

## 2018-03-07 PROCEDURE — 99233 SBSQ HOSP IP/OBS HIGH 50: CPT | Mod: GC

## 2018-03-07 RX ORDER — SODIUM CHLORIDE 9 MG/ML
1000 INJECTION, SOLUTION INTRAVENOUS
Qty: 0 | Refills: 0 | Status: DISCONTINUED | OUTPATIENT
Start: 2018-03-07 | End: 2018-03-07

## 2018-03-07 RX ORDER — ACETAMINOPHEN 500 MG
1000 TABLET ORAL ONCE
Qty: 0 | Refills: 0 | Status: COMPLETED | OUTPATIENT
Start: 2018-03-07 | End: 2018-03-07

## 2018-03-07 RX ORDER — HYDROMORPHONE HYDROCHLORIDE 2 MG/ML
1 INJECTION INTRAMUSCULAR; INTRAVENOUS; SUBCUTANEOUS
Qty: 0 | Refills: 0 | Status: DISCONTINUED | OUTPATIENT
Start: 2018-03-07 | End: 2018-03-08

## 2018-03-07 RX ORDER — SODIUM CHLORIDE 9 MG/ML
1000 INJECTION, SOLUTION INTRAVENOUS
Qty: 0 | Refills: 0 | Status: DISCONTINUED | OUTPATIENT
Start: 2018-03-07 | End: 2018-03-08

## 2018-03-07 RX ORDER — FENTANYL CITRATE 50 UG/ML
1 INJECTION INTRAVENOUS
Qty: 0 | Refills: 0 | Status: DISCONTINUED | OUTPATIENT
Start: 2018-03-07 | End: 2018-03-08

## 2018-03-07 RX ORDER — HYDROMORPHONE HYDROCHLORIDE 2 MG/ML
1 INJECTION INTRAMUSCULAR; INTRAVENOUS; SUBCUTANEOUS EVERY 4 HOURS
Qty: 0 | Refills: 0 | Status: DISCONTINUED | OUTPATIENT
Start: 2018-03-07 | End: 2018-03-07

## 2018-03-07 RX ORDER — CEFTRIAXONE 500 MG/1
1 INJECTION, POWDER, FOR SOLUTION INTRAMUSCULAR; INTRAVENOUS EVERY 24 HOURS
Qty: 0 | Refills: 0 | Status: DISCONTINUED | OUTPATIENT
Start: 2018-03-08 | End: 2018-03-08

## 2018-03-07 RX ORDER — MORPHINE SULFATE 50 MG/1
2 CAPSULE, EXTENDED RELEASE ORAL ONCE
Qty: 0 | Refills: 0 | Status: DISCONTINUED | OUTPATIENT
Start: 2018-03-07 | End: 2018-03-07

## 2018-03-07 RX ORDER — SODIUM CHLORIDE 9 MG/ML
250 INJECTION INTRAMUSCULAR; INTRAVENOUS; SUBCUTANEOUS ONCE
Qty: 0 | Refills: 0 | Status: DISCONTINUED | OUTPATIENT
Start: 2018-03-07 | End: 2018-03-07

## 2018-03-07 RX ORDER — CEFTRIAXONE 500 MG/1
INJECTION, POWDER, FOR SOLUTION INTRAMUSCULAR; INTRAVENOUS
Qty: 0 | Refills: 0 | Status: DISCONTINUED | OUTPATIENT
Start: 2018-03-07 | End: 2018-03-08

## 2018-03-07 RX ORDER — CEFTRIAXONE 500 MG/1
1 INJECTION, POWDER, FOR SOLUTION INTRAMUSCULAR; INTRAVENOUS ONCE
Qty: 0 | Refills: 0 | Status: COMPLETED | OUTPATIENT
Start: 2018-03-07 | End: 2018-03-07

## 2018-03-07 RX ORDER — CEFTRIAXONE 500 MG/1
INJECTION, POWDER, FOR SOLUTION INTRAMUSCULAR; INTRAVENOUS
Qty: 0 | Refills: 0 | Status: DISCONTINUED | OUTPATIENT
Start: 2018-03-07 | End: 2018-03-07

## 2018-03-07 RX ORDER — HALOPERIDOL DECANOATE 100 MG/ML
1 INJECTION INTRAMUSCULAR ONCE
Qty: 0 | Refills: 0 | Status: COMPLETED | OUTPATIENT
Start: 2018-03-07 | End: 2018-03-07

## 2018-03-07 RX ORDER — ALBUMIN HUMAN 25 %
50 VIAL (ML) INTRAVENOUS ONCE
Qty: 0 | Refills: 0 | Status: COMPLETED | OUTPATIENT
Start: 2018-03-07 | End: 2018-03-07

## 2018-03-07 RX ADMIN — LACTULOSE 20 GRAM(S): 10 SOLUTION ORAL at 05:46

## 2018-03-07 RX ADMIN — Medication 1 DROP(S): at 14:49

## 2018-03-07 RX ADMIN — MORPHINE SULFATE 2 MILLIGRAM(S): 50 CAPSULE, EXTENDED RELEASE ORAL at 10:41

## 2018-03-07 RX ADMIN — Medication 1 DROP(S): at 22:55

## 2018-03-07 RX ADMIN — MORPHINE SULFATE 2 MILLIGRAM(S): 50 CAPSULE, EXTENDED RELEASE ORAL at 02:12

## 2018-03-07 RX ADMIN — Medication 1 DROP(S): at 05:46

## 2018-03-07 RX ADMIN — FENTANYL CITRATE 1 PATCH: 50 INJECTION INTRAVENOUS at 10:57

## 2018-03-07 RX ADMIN — Medication 40 MILLIGRAM(S): at 05:46

## 2018-03-07 RX ADMIN — MORPHINE SULFATE 2 MILLIGRAM(S): 50 CAPSULE, EXTENDED RELEASE ORAL at 03:59

## 2018-03-07 RX ADMIN — MORPHINE SULFATE 2 MILLIGRAM(S): 50 CAPSULE, EXTENDED RELEASE ORAL at 11:00

## 2018-03-07 RX ADMIN — HYDROMORPHONE HYDROCHLORIDE 0.5 MILLIGRAM(S): 2 INJECTION INTRAMUSCULAR; INTRAVENOUS; SUBCUTANEOUS at 06:45

## 2018-03-07 RX ADMIN — HALOPERIDOL DECANOATE 1 MILLIGRAM(S): 100 INJECTION INTRAMUSCULAR at 10:57

## 2018-03-07 RX ADMIN — Medication 1 MILLIGRAM(S): at 14:49

## 2018-03-07 RX ADMIN — HYDROMORPHONE HYDROCHLORIDE 0.5 MILLIGRAM(S): 2 INJECTION INTRAMUSCULAR; INTRAVENOUS; SUBCUTANEOUS at 05:46

## 2018-03-07 RX ADMIN — SODIUM CHLORIDE 30 MILLILITER(S): 9 INJECTION, SOLUTION INTRAVENOUS at 17:19

## 2018-03-07 RX ADMIN — Medication 50 MILLILITER(S): at 23:51

## 2018-03-07 RX ADMIN — CEFTRIAXONE 100 GRAM(S): 500 INJECTION, POWDER, FOR SOLUTION INTRAMUSCULAR; INTRAVENOUS at 10:58

## 2018-03-07 RX ADMIN — HALOPERIDOL DECANOATE 0.5 MILLIGRAM(S): 100 INJECTION INTRAMUSCULAR at 00:07

## 2018-03-07 RX ADMIN — Medication 1 MILLIGRAM(S): at 17:55

## 2018-03-07 NOTE — PROGRESS NOTE ADULT - PROBLEM SELECTOR PLAN 4
pt with albumin level of 1.7 likely 2/2 cancer   discussed with sister that pr is currently not able to return home and will need long term placement, will benefit from hospice. Both sisters agree and wants pt to not suffer and agrees with comfort care.   f/u social work consult pt with hepatocellular carcinoma with worsening liver function  currently at terminal stage HCC ECOG 4  now a candidate for inpt hospice

## 2018-03-07 NOTE — PROGRESS NOTE ADULT - PROBLEM SELECTOR PLAN 1
pt had progressive decline in mental status since admission, but over weekend, significant increase in lethargy, confused and with decreased oral intake   possible metabolic encephalopathy 2/2 due hepatorenal syndrome (ammonia level 84)  obstructive jaundice, LFTs and bilirubin rapidly increasing (unlikely due to opioids)  added rifaximin 550mg BID  c/w lactulose as tolerated pt complained of worsening pain overnight  Fentanyl patch increased from 12.5mcg to 25mcg q/ 72 hrs and increased Dilaudid to 1mg q 2 hrs PRN

## 2018-03-07 NOTE — PROGRESS NOTE ADULT - PROBLEM SELECTOR PLAN 2
pt complained of worsening pain overnight  Fentanyl patch increased from 12.5mcg to 25mcg q/ 72 hrs and increased Dilaudid to 1mg q/4 hrs as needed for pain due to worsening hepatorenal syndrome - ativan PRNs added

## 2018-03-07 NOTE — PROGRESS NOTE ADULT - PROVIDER SPECIALTY LIST ADULT
Heme/Onc
Heme/Onc
Internal Medicine
Pain Medicine
Internal Medicine
Palliative Care
Palliative Care

## 2018-03-07 NOTE — PROGRESS NOTE ADULT - ASSESSMENT
62 yo F from home , with a hx of HCC involving 2 lobes of liver diagnosed in 6/2016 s/p IR embolization, COPD (not on home O2), DM, Hep C , HTN, Herpes Zoster sent to ED from home by visiting home services when they found her on " bad condition" [as noted in H&P] [ found on the floor and APS on the case as per ) at home. only Complaints of pt is abdominal pain which is chronic since diagnosis of HCC.   Pt is admitted for acute liver failure sx 2/2 HCC , chronic hep -c   Goal would be comfort, pain control , sx management, pending palliative consult, consulted pain Mx.        1. Hepatocellular carcinoma.  Plan: hepatoma with possible lung metastasis  stage -4 with end stage liver disorder  high LFTs, lethargy , will get ammonia levels , started lactulose today  S/p IR embolization on previous admission last week   paracentesis done on previous admission   pain mx to f/u for pain - c/w Fentanyl patch, Gabapentin and PRN Dilaudid  will need hospice care, palliative saw pt today - pt is a candidate to residential hospice  Dr. Payne hem /onc - currently at terminal stage hepatoma not a candidate for chemotherapy        2. Bilateral edema of lower extremity.  Plan: most likely from liver failure   stable for longtime  continue lasix 40 BID.  will watch bp closely , consider albumin for low BP       3.Diabetes mellitus type 2, insulin dependent.  Plan: home medication lantus 30 HS and humalog 7 TID   pt was on on Lantus 15 units at bedtime and accucheck with sliding scale   recent HBA1C level of 5.8 on previous admission in feb 2018.  FS on low side , pt's PO intake minimal ,  dced lantus 10 units qhs , c/w mild scale HSS only    4. COPD (chronic obstructive pulmonary disease).  Plan: continue home medication  stable.     5.HTN (hypertension).  Plan: d/titi home medication metoprolol 25 mg BID. BP on low side due to pain med    6.  Need for prophylactic measure.   IMPROVE VTE Score:  4  will start on Lovenox subcutaneous.    7. Hyperkalemia with Temi  pending residential hospice placement  temi 2/2 hepatorenal ?; on lasix  will give Kayexalate  pt is not co-operative for urine lytes and EKG    long term plan- Palliative care consulted, DNR/DNI ; GOC discussion done with Family contact - sister - Trudy Goyal  [phone] ; her boyfriend in hospital
62 yo F from home , with a hx of HCC involving 2 lobes of liver diagnosed in 6/2016 s/p IR embolization, COPD (not on home O2), DM, Hep C , HTN, Herpes Zoster sent to ED from home by visiting home services when they found her on " bad condition" [as noted in H&P] [ found on the floor and APS on the case as per ) at home. only Complaints of pt is abdominal pain which is chronic since diagnosis of HCC.   Pt is admitted for acute liver failure sx 2/2 HCC , chronic hep -c   Goal would be comfort, pain control , sx management, pending palliative consult, consulted pain Mx.        1. Hepatocellular carcinoma.  Plan: hepatoma with possible lung metastasis  stage -4 with end stage liver disorder  high LFTs, lethargy , will get ammonia levels , started lactulose today  S/p IR embolization on previous admission last week   paracentesis done on previous admission   pain mx to f/u for pain - c/w Fentanyl patch, Gabapentin and PRN Dilaudid  will need hospice care, palliative saw pt today - pt is a candidate to residential hospice  Dr. Payne hem /onc - currently at terminal stage hepatoma not a candidate for chemotherapy        2. Bilateral edema of lower extremity.  Plan: most likely from liver failure   stable for longtime  continue lasix 40 BID.  will watch bp closely , consider albumin for low BP       3.Diabetes mellitus type 2, insulin dependent.  Plan: home medication lantus 30 HS and humalog 7 TID   pt was on on Lantus 15 units at bedtime and accucheck with sliding scale   recent HBA1C level of 5.8 on previous admission in feb 2018.  FS on low side , pt's PO intake minimal ,  dced lantus 10 units qhs , c/w mild scale HSS only    4. COPD (chronic obstructive pulmonary disease).  Plan: continue home medication  stable.     5.HTN (hypertension).  Plan: d/titi home medication metoprolol 25 mg BID. BP on low side due to pain med    6.  Need for prophylactic measure. Plan: IMPROVE VTE Individual Risk Assessment  IMPROVE VTE Score:  4  will start on Lovenox subcutaneous.    long term plan- Palliative care consulted, DNR/DNI ; GOC discussion done with Family contact - sister - Trudy Goyal  [phone] ; her boyfriend in hospital
62 yo F from home , with a hx of HCC involving 2 lobes of liver diagnosed in 6/2016 s/p IR embolization, COPD (not on home O2), DM, Hep C , HTN, Herpes Zoster sent to ED from home by visiting home services when they found her on " bad condition" [as noted in H&P] [ found on the floor and APS on the case as per ) at home. only Complaints of pt is abdominal pain which is chronic since diagnosis of HCC.   Pt is admitted for acute liver failure sx 2/2 HCC , chronic hep -c .  Goal would be comfort, pain control , sx management, palliative care following, pain Mx consult following.        1. Hepatocellular carcinoma.  hepatoma with possible lung metastasis  stage -4 with end stage liver disorder  high LFTs, lethargy , will get ammonia levels , started lactulose today  S/p IR embolization on previous admission last week   paracentesis done on previous admission   pain mx to f/u for pain - c/w Fentanyl patch (increased dose today), Gabapentin and PRN Dilaudid ( increased dose today)   Dr. Payne hem /onc - currently at terminal stage hepatoma not a candidate for chemotherapy    will need hospice care, palliative saw pt today -pt may be a candidate for [facility] now or in hospice (near future)      2. Bilateral edema of lower extremity.  Plan: most likely from liver failure   stable for longtime  continue lasix 40 BID.  will watch bp closely , consider albumin for low BP       3.Diabetes mellitus type 2, insulin dependent.  Plan: home medication lantus 30 HS and humalog 7 TID   pt was on on Lantus 15 units at bedtime and accucheck with sliding scale   recent HBA1C level of 5.8 on previous admission in feb 2018.  FS on low side , pt's PO intake minimal ,  dced lantus 10 units qhs , c/w mild scale HSS only    4. COPD (chronic obstructive pulmonary disease).  Plan: continue home medication  stable.     5.HTN (hypertension).  Plan: d/titi home medication metoprolol 25 mg BID. BP on low side due to pain med    6.  Need for prophylactic measure.   IMPROVE VTE Score:  4  will start on Lovenox subcutaneous.    7. Hyperkalemia with Temi  yest labs , not following labs today  pending residential hospice placement  temi 2/2 hepatorenal ?; on lasix  s/p Kayexalate yest  pt is not co-operative for urine lytes and EKG    long term plan- Palliative care consulted, DNR/DNI ; GOC discussion done with Family contact - sister - Trudy Goyal  [phone] ; her boyfriend in hospital
62 yo F from home , with a hx of HCC involving 2 lobes of liver diagnosed in 6/2016 s/p IR embolization, COPD (not on home O2), DM, Hep C , HTN, Herpes Zoster sent to ED from home by visiting home services when they found her on " bad condition" [as noted in H&P] at home. only Complaints of pt is abdominal pain which is chronic since diagnosis of HCC.   Pt is admitted for acute liver failure sx 2/2 HCC , chronic hep -c   Goal would be comfort, pain control , sx management, pending palliative consult, consulted pain Mx.        1. Hepatocellular carcinoma.  Plan: hepatocellular carcinoma with B/ L lung metastasis  S/p IR embolization on previous admission last week   paracentesis done on previous admission   distended abdomen noted - mild respiratory distress , plan d/w attending   We can hold off on paracentesis as INR is high and eventual plan for hospice  pain mx to f/u for pain    2. Bilateral edema of lower extremity.  Plan: most likely from liver failure   stable for longtime  contiune lasix 40 BID.       3.Diabetes mellitus type 2, insulin dependent.  Plan: home medication lantus 30 HS and humalog 7 TID   pt was on on Lantus 15 units at bedtime and accucheck with sliding scale   recent HBA1C level of 5.8 on previous admission in feb 2018.  FS on low side , pt's PO intake minimal ,  cut down lantus dose to 10 units qhs , c/w mild scale HSS  4. COPD (chronic obstructive pulmonary disease).  Plan: continue home medication  stable.     5.HTN (hypertension).  Plan: continue home medication metoprolol 25 mg BID.     6.  Need for prophylactic measure. Plan: IMPROVE VTE Individual Risk Assessment  IMPROVE VTE Score:  4  will start on lovenox subcutaneous.    long term plan- Palliative care consulted , DNR/DNI, called emergency contact number in [place], was not able to get in touch with [facility]
62 yo F from home , with a hx of HCC involving 2 lobes of liver diagnosed in 6/2016 s/p IR embolization, COPD (not on home O2), DM, Hep C , HTN, Herpes Zoster sent to ED from home by visiting home services when they found her on bed condition at home. Complaints of abdominal pain which is chronic since diagnosis of HCC.  Denies nausea, vomiting, diarrhea, fever, chills, cough. (01 Mar 2018 21:18)
64 yo F from home , with a hx of HCC involving 2 lobes of liver diagnosed in 6/2016 s/p IR embolization, COPD (not on home O2), DM, Hep C , HTN, Herpes Zoster sent to ED from home by visiting home services when they found her on bed condition at home. Complaints of abdominal pain which is chronic since diagnosis of HCC.  Denies nausea, vomiting, diarrhea, fever, chills, cough. (01 Mar 2018 21:18)

## 2018-03-07 NOTE — PROGRESS NOTE ADULT - PROBLEM SELECTOR PLAN 3
pt with history of hepatocellular carcinoma with worsening liver function  currently at terminal stage hepatoma not a candidate for chemotherapy   pt may be a candidate for [facility] now or inpt hospice (near future) pt had progressive decline in mental status since admission, over weekend, significant increase in lethargy, confused and with decreased oral intake   possible metabolic encephalopathy 2/2 due hepatorenal syndrome (ammonia level 84)  obstructive jaundice, LFTs and bilirubin rapidly increasing   c/w rifaximin 550mg BID and lactulose as tolerated

## 2018-03-07 NOTE — PROGRESS NOTE ADULT - SUBJECTIVE AND OBJECTIVE BOX
INTERVAL HPI/OVERNIGHT EVENTS:  Pt is more confused, receiving lactulose , complains of pain. Adjusted pain meds today, pain mx to follow up.  Pt is agitated and asking me to leave the room.    VITAL SIGNS:  Vital Signs Last 24 Hrs  T(C): 37 (07 Mar 2018 05:19), Max: 37 (07 Mar 2018 05:19)  T(F): 98.6 (07 Mar 2018 05:19), Max: 98.6 (07 Mar 2018 05:19)  HR: 88 (07 Mar 2018 10:27) (60 - 88)  BP: 109/89 (07 Mar 2018 10:27) (103/51 - 109/89)  BP(mean): --  RR: 18 (07 Mar 2018 10:27) (16 - 18)  SpO2: 92% (07 Mar 2018 10:27) (92% - 97%)    PHYSICAL EXAM:  unable to assess as pt is uncooperative    MEDICATIONS  (STANDING):  MEDICATIONS  (STANDING):  artificial  tears Solution 1 Drop(s) Both EYES three times a day  cefTRIAXone   IVPB      dextrose 5%. 1000 milliLiter(s) (50 mL/Hr) IV Continuous <Continuous>  dextrose 50% Injectable 12.5 Gram(s) IV Push once  dextrose 50% Injectable 25 Gram(s) IV Push once  dextrose 50% Injectable 25 Gram(s) IV Push once  enoxaparin Injectable 40 milliGRAM(s) SubCutaneous daily  fentaNYL   Patch  25 MICROgram(s)/Hr 1 Patch Transdermal every 72 hours  furosemide    Tablet 40 milliGRAM(s) Oral two times a day  insulin lispro (HumaLOG) corrective regimen sliding scale   SubCutaneous three times a day before meals  lactulose Syrup 20 Gram(s) Oral two times a day  rifaximin 550 milliGRAM(s) Oral two times a day  senna 2 Tablet(s) Oral at bedtime  tiotropium 18 MICROgram(s) Capsule 1 Capsule(s) Inhalation daily    MEDICATIONS  (PRN):  dextrose Gel 1 Dose(s) Oral once PRN Blood Glucose LESS THAN 70 milliGRAM(s)/deciliter  glucagon  Injectable 1 milliGRAM(s) IntraMuscular once PRN Glucose LESS THAN 70 milligrams/deciliter  HYDROmorphone  Injectable 1 milliGRAM(s) IV Push every 2 hours PRN Severe Pain (7 - 10)  Allergies    penicillin (Anaphylaxis)    Intolerances        LABS:    not following labs today                           12.9   21.2  )-----------( 70       ( 06 Mar 2018 07:48 )             42.9       03-06    139  |  97  |  78<H>  ----------------------------<  98  5.6<H>   |  20<L>  |  3.96<H>    Ca    8.4      06 Mar 2018 07:48    TPro  6.1  /  Alb  1.6<L>  /  TBili  16.2<H>  /  DBili  x   /  AST  3049<H>  /  ALT  408<H>  /  AlkPhos  343<H>  03-06         POCT Blood Glucose.: 107 mg/dL (07 Mar 2018 11:51)

## 2018-03-07 NOTE — CHART NOTE - NSCHARTNOTEFT_GEN_A_CORE
Paged because pt stating pain is not managed appropriately, and she is up all night.  Please consider revising pain regimen in AM.     Vital Signs Last 24 Hrs  T(C): 36.8 (06 Mar 2018 14:22), Max: 36.8 (06 Mar 2018 14:22)  T(F): 98.3 (06 Mar 2018 14:22), Max: 98.3 (06 Mar 2018 14:22)  HR: 60 (06 Mar 2018 14:22) (60 - 86)  BP: 103/51 (06 Mar 2018 14:22) (103/51 - 114/54)  RR: 16 (06 Mar 2018 14:22) (16 - 17)  SpO2: 97% (06 Mar 2018 14:22) (92% - 97%)

## 2018-03-07 NOTE — PROGRESS NOTE ADULT - PROBLEM SELECTOR PLAN 5
Spoke over the phone with sister and next of kin Trudy Goyal Home [phone] Cell [phone] - agrees for [facility] referral and if pt is eligible inpt hospice.  f/u social work consult Spoke over the phone with sister Trudy Goyal Cell [phone] - agrees for in hospice. Wants pt to be comfort with good symptom management. Other sister Iesha came to visit last night. support given

## 2018-03-07 NOTE — PROGRESS NOTE ADULT - SUBJECTIVE AND OBJECTIVE BOX
OVERNIGHT EVENTS: Pt complaining of pain overnight, fentanyl patch dose was increased as well as Dilaudid 1mg q / 4 hrs as needed for pain. Also continues to have worsening hepatic encephalopathy     Review of Systems: Unable to obtain due to poor mentation    MEDICATIONS  (STANDING):    ·	artificial  tears Solution 1 Drop(s) Both EYES three times a day  ·	cefTRIAXone   IVPB      ·	dextrose 5%. 1000 milliLiter(s) (50 mL/Hr) IV Continuous <Continuous>  ·	dextrose 50% Injectable 12.5 Gram(s) IV Push once  ·	dextrose 50% Injectable 25 Gram(s) IV Push once  ·	dextrose 50% Injectable 25 Gram(s) IV Push once  ·	enoxaparin Injectable 40 milliGRAM(s) SubCutaneous daily  ·	fentaNYL   Patch  25 MICROgram(s)/Hr 1 Patch Transdermal every 72 hours  ·	furosemide    Tablet 40 milliGRAM(s) Oral two times a day  ·	insulin lispro (HumaLOG) corrective regimen sliding scale   SubCutaneous three times a day before meals  ·	lactulose Syrup 20 Gram(s) Oral two times a day  ·	rifaximin 550 milliGRAM(s) Oral two times a day  ·	senna 2 Tablet(s) Oral at bedtime  ·	tiotropium 18 MICROgram(s) Capsule 1 Capsule(s) Inhalation daily    MEDICATIONS  (PRN):    ·	dextrose Gel 1 Dose(s) Oral once PRN Blood Glucose LESS THAN 70 milliGRAM(s)/deciliter  ·	glucagon  Injectable 1 milliGRAM(s) IntraMuscular once PRN Glucose LESS THAN 70 milligrams/deciliter  ·	HYDROmorphone  Injectable 1 milliGRAM(s) IV Push every 4 hours PRN Severe Pain (7 - 10)      PHYSICAL EXAM:  Vital Signs Last 24 Hrs  ·	T(C): 37 (07 Mar 2018 05:19), Max: 37 (07 Mar 2018 05:19)  ·	T(F): 98.6 (07 Mar 2018 05:19), Max: 98.6 (07 Mar 2018 05:19)  ·	HR: 88 (07 Mar 2018 10:27) (60 - 88)  ·	BP: 109/89 (07 Mar 2018 10:27) (103/51 - 109/89)  ·	RR: 18 (07 Mar 2018 10:27) (16 - 18)  ·	SpO2: 92% (07 Mar 2018 10:27) (92% - 97%)  ·	General: alert  oriented x 1 (person) confused and with nonsensical speech   ·	Karnofsky Performance Score/Palliative Performance Status Version2: 20 %    HEENT: normal    Lungs: comfortable   CV: sinus tachycardia  GI: distended with ascites, last BM: today in AM  : incontinent   Musculoskeletal: weakness, LE edema  Skin: normal    Neuro: cognitive impairment, altered mental status   Oral intake ability: moderate capability  Diet: regular     LABS:                          12.9   21.2  )-----------( 70       ( 06 Mar 2018 07:48 )             42.9     03-06    139  |  97  |  78<H>  ----------------------------<  98  5.6<H>   |  20<L>  |  3.96<H>    Ca    8.4      06 Mar 2018 07:48    TPro  6.1  /  Alb  1.6<L>  /  TBili  16.2<H>  /  DBili  x   /  AST  3049<H>  /  ALT  408<H>  /  AlkPhos  343<H>  03-06 OVERNIGHT EVENTS: Pt complaining of more pain overnight and more agitation, pulled out fernandez cath and IV line. likely due to worsening hepatorenal syndrome     Review of Systems: Unable to obtain due to poor mentation    MEDICATIONS  (STANDING):    ·	artificial  tears Solution 1 Drop(s) Both EYES three times a day  ·	cefTRIAXone   IVPB      ·	dextrose 5%. 1000 milliLiter(s) (50 mL/Hr) IV Continuous <Continuous>  ·	dextrose 50% Injectable 12.5 Gram(s) IV Push once  ·	dextrose 50% Injectable 25 Gram(s) IV Push once  ·	dextrose 50% Injectable 25 Gram(s) IV Push once  ·	enoxaparin Injectable 40 milliGRAM(s) SubCutaneous daily  ·	fentaNYL   Patch  25 MICROgram(s)/Hr 1 Patch Transdermal every 72 hours  ·	furosemide    Tablet 40 milliGRAM(s) Oral two times a day  ·	insulin lispro (HumaLOG) corrective regimen sliding scale   SubCutaneous three times a day before meals  ·	lactulose Syrup 20 Gram(s) Oral two times a day  ·	rifaximin 550 milliGRAM(s) Oral two times a day  ·	senna 2 Tablet(s) Oral at bedtime  ·	tiotropium 18 MICROgram(s) Capsule 1 Capsule(s) Inhalation daily    MEDICATIONS  (PRN):    ·	dextrose Gel 1 Dose(s) Oral once PRN Blood Glucose LESS THAN 70 milliGRAM(s)/deciliter  ·	glucagon  Injectable 1 milliGRAM(s) IntraMuscular once PRN Glucose LESS THAN 70 milligrams/deciliter  ·	HYDROmorphone  Injectable 1 milliGRAM(s) IV Push every 4 hours PRN Severe Pain (7 - 10)      PHYSICAL EXAM:  Vital Signs Last 24 Hrs  ·	T(C): 37 (07 Mar 2018 05:19), Max: 37 (07 Mar 2018 05:19)  ·	T(F): 98.6 (07 Mar 2018 05:19), Max: 98.6 (07 Mar 2018 05:19)  ·	HR: 88 (07 Mar 2018 10:27) (60 - 88)  ·	BP: 109/89 (07 Mar 2018 10:27) (103/51 - 109/89)  ·	RR: 18 (07 Mar 2018 10:27) (16 - 18)  ·	SpO2: 92% (07 Mar 2018 10:27) (92% - 97%)  ·	General: alert  oriented x 1 (person) confused and with nonsensical speech   ·	Karnofsky Performance Score/Palliative Performance Status Version2: 20 %    HEENT: normal    Lungs: comfortable   CV: sinus tachycardia  GI: distended with ascites, last BM: today in AM  : incontinent   Musculoskeletal: weakness, LE edema  Skin: normal    Neuro: cognitive impairment, altered mental status   Oral intake ability: moderate capability  Diet: regular     LABS:                          12.9   21.2  )-----------( 70       ( 06 Mar 2018 07:48 )             42.9     03-06    139  |  97  |  78<H>  ----------------------------<  98  5.6<H>   |  20<L>  |  3.96<H>    Ca    8.4      06 Mar 2018 07:48    TPro  6.1  /  Alb  1.6<L>  /  TBili  16.2<H>  /  DBili  x   /  AST  3049<H>  /  ALT  408<H>  /  AlkPhos  343<H>  03-06

## 2018-03-08 VITALS — DIASTOLIC BLOOD PRESSURE: 49 MMHG | SYSTOLIC BLOOD PRESSURE: 70 MMHG

## 2018-03-08 LAB — GLUCOSE BLDC GLUCOMTR-MCNC: 72 MG/DL — SIGNIFICANT CHANGE UP (ref 70–99)

## 2018-03-08 PROCEDURE — 71045 X-RAY EXAM CHEST 1 VIEW: CPT

## 2018-03-08 PROCEDURE — 99285 EMERGENCY DEPT VISIT HI MDM: CPT | Mod: 25

## 2018-03-08 PROCEDURE — P9047: CPT

## 2018-03-08 PROCEDURE — 94640 AIRWAY INHALATION TREATMENT: CPT

## 2018-03-08 PROCEDURE — 96374 THER/PROPH/DIAG INJ IV PUSH: CPT

## 2018-03-08 PROCEDURE — 82746 ASSAY OF FOLIC ACID SERUM: CPT

## 2018-03-08 PROCEDURE — 93005 ELECTROCARDIOGRAM TRACING: CPT

## 2018-03-08 PROCEDURE — 82306 VITAMIN D 25 HYDROXY: CPT

## 2018-03-08 PROCEDURE — 82140 ASSAY OF AMMONIA: CPT

## 2018-03-08 PROCEDURE — 85730 THROMBOPLASTIN TIME PARTIAL: CPT

## 2018-03-08 PROCEDURE — 85610 PROTHROMBIN TIME: CPT

## 2018-03-08 PROCEDURE — 80053 COMPREHEN METABOLIC PANEL: CPT

## 2018-03-08 PROCEDURE — 82962 GLUCOSE BLOOD TEST: CPT

## 2018-03-08 PROCEDURE — 85027 COMPLETE CBC AUTOMATED: CPT

## 2018-03-08 RX ORDER — ALBUMIN HUMAN 25 %
50 VIAL (ML) INTRAVENOUS ONCE
Qty: 0 | Refills: 0 | Status: COMPLETED | OUTPATIENT
Start: 2018-03-08 | End: 2018-03-08

## 2018-03-08 RX ADMIN — CEFTRIAXONE 100 GRAM(S): 500 INJECTION, POWDER, FOR SOLUTION INTRAMUSCULAR; INTRAVENOUS at 09:22

## 2018-03-08 RX ADMIN — Medication 1 MILLIGRAM(S): at 00:05

## 2018-03-08 RX ADMIN — Medication 1 DROP(S): at 05:29

## 2018-03-08 RX ADMIN — Medication 50 MILLILITER(S): at 09:21

## 2018-03-08 RX ADMIN — Medication 1 MILLIGRAM(S): at 03:55

## 2018-03-08 NOTE — DISCHARGE NOTE FOR THE EXPIRED PATIENT - HOSPITAL COURSE
64 yo F from home , with a hx of HCC involving 2 lobes of liver diagnosed in 2016 s/p IR embolization, COPD (not on home O2), DM, Hep C , HTN, Herpes Zoster sent to ED from home by visiting home services when they found her on " bad condition" [as noted in H&P] [ found on the floor and APS on the case as per ) at home. only Complaints of pt is abdominal pain which is chronic since diagnosis of HCC.   Pt is admitted for acute liver failure sx 2/2 HCC , chronic hep -c . 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   Dr. Payne hem /onc - currently at terminal stage hepatoma not a candidate for chemotherapy   Long term goal was comfort, pain control , sx management, palliative care consulted , pt was for pending hospice transfer.  DNR/DNI ; GOC discussion done with Family contact - sister - Trudy Goyal  [phone] ; her boyfriend was in hospital     On 2018  This is only summary of her hospital course , for details please review o patient's complete chart.   From morning today , pt is less responsive, Pt has low BP, Pending inpatient hospice transfer, expected poor prognosis. RN called me at 11-39 am to assess patient for absent breathing and pulselessness. Pt was assessed by me at bedside, no pupillary reflexes, no spontaneous breathing, absent breath sounds, no response to sternal rubs, absent pulse ( checked by palpatory methods and US doppler prob), pt has flat line on remote tele machine. Pt was pronounced  at 11-41 am today. Sister next of ketristian made aware over the phone (Trudy Goyal she refused for autopsy.

## 2020-04-28 NOTE — H&P ADULT - PSH
Could be undiagnosed carpal tunnel impingement.  EMG tests ordered.   H/O eye surgery    H/O lumpectomy    History of liver biopsy

## 2021-02-22 NOTE — DISCHARGE NOTE FOR THE EXPIRED PATIENT - ORGAN DONOR #
Dinner trays delivered to patient and Dad  Patient calm and cooperative and in no distress at this time  1;1 viki is present       Reanna Nava  02/21/21 1954 [id number]

## 2021-04-02 NOTE — PATIENT PROFILE ADULT. - FUNCTIONAL SCREEN CURRENT LEVEL: COMMUNICATION, MLM
Head, normocephalic, atraumatic, Face, Face within normal limits, Ears, External ears within normal limits, Nose/Nasopharynx, External nose normal appearance, nares patent, no nasal discharge, Mouth and Throat, Oral cavity appearance normal, Lips, Appearance normal
(0) understands/communicates without difficulty

## 2021-05-20 NOTE — CONSULT NOTE ADULT - NEGATIVE RESPIRATORY AND THORAX SYMPTOMS
"Chief Complaint   Patient presents with     WOUND CARE       Initial BP (!) 148/76   Pulse 67   Temp 96.6  F (35.9  C) (Tympanic)   Wt 92.5 kg (204 lb)   SpO2 97%   BMI 38.55 kg/m   Estimated body mass index is 38.55 kg/m  as calculated from the following:    Height as of 5/14/21: 1.549 m (5' 1\").    Weight as of this encounter: 92.5 kg (204 lb).  Medication Reconciliation: complete  Jacinda Meyers LPN    " no dyspnea/no cough/no wheezing

## 2021-10-18 NOTE — PATIENT PROFILE ADULT. - VISION (WITH CORRECTIVE LENSES IF THE PATIENT USUALLY WEARS THEM):
Left First message for Patient on   Mobile [phone]    their appointment with TABITHA Chavarria on 12/10/21 has been canceled due to provider is changing locations from [facility] to [facility]. If that appointment time does not work please call [phone] to reschedule. Provider [place] [address] [place] [address] and [address]'s. Provider is at [facility] only on [address] and Friday's. Normal vision: sees adequately in most situations; can see medication labels, newsprint

## 2021-12-28 NOTE — ED ADULT NURSE REASSESSMENT NOTE - GENERAL PATIENT STATE
comfortable appearance/cooperative/resting/sleeping
Detail Level: Detailed
Quality 226: Preventive Care And Screening: Tobacco Use: Screening And Cessation Intervention: Patient screened for tobacco use and is an ex/non-smoker
Quality 431: Preventive Care And Screening: Unhealthy Alcohol Use - Screening: Patient not identified as an unhealthy alcohol user when screened for unhealthy alcohol use using a systematic screening method
Quality 111:Pneumonia Vaccination Status For Older Adults: Pneumococcal Vaccination Previously Received
Quality 110: Preventive Care And Screening: Influenza Immunization: Influenza Immunization Administered during Influenza season
